# Patient Record
Sex: MALE
[De-identification: names, ages, dates, MRNs, and addresses within clinical notes are randomized per-mention and may not be internally consistent; named-entity substitution may affect disease eponyms.]

---

## 2017-11-05 ENCOUNTER — CHARTING TRANS (OUTPATIENT)
Dept: OTHER | Age: 32
End: 2017-11-05

## 2017-11-05 ENCOUNTER — LAB SERVICES (OUTPATIENT)
Dept: OTHER | Age: 32
End: 2017-11-05

## 2017-11-05 LAB — RAPID STREP GROUP A: NORMAL

## 2018-11-02 VITALS
RESPIRATION RATE: 16 BRPM | HEIGHT: 75 IN | TEMPERATURE: 98.6 F | BODY MASS INDEX: 21.76 KG/M2 | SYSTOLIC BLOOD PRESSURE: 110 MMHG | HEART RATE: 70 BPM | WEIGHT: 175 LBS | DIASTOLIC BLOOD PRESSURE: 70 MMHG

## 2023-12-13 ENCOUNTER — LAB ENCOUNTER (OUTPATIENT)
Dept: LAB | Age: 38
End: 2023-12-13
Attending: FAMILY MEDICINE
Payer: COMMERCIAL

## 2023-12-13 ENCOUNTER — OFFICE VISIT (OUTPATIENT)
Dept: FAMILY MEDICINE CLINIC | Facility: CLINIC | Age: 38
End: 2023-12-13
Payer: COMMERCIAL

## 2023-12-13 VITALS
TEMPERATURE: 99 F | HEIGHT: 75 IN | BODY MASS INDEX: 23.87 KG/M2 | DIASTOLIC BLOOD PRESSURE: 56 MMHG | SYSTOLIC BLOOD PRESSURE: 100 MMHG | RESPIRATION RATE: 18 BRPM | HEART RATE: 58 BPM | WEIGHT: 192 LBS

## 2023-12-13 DIAGNOSIS — M25.50 ARTHRALGIA, UNSPECIFIED JOINT: ICD-10-CM

## 2023-12-13 DIAGNOSIS — E78.00 HYPERCHOLESTEREMIA: ICD-10-CM

## 2023-12-13 DIAGNOSIS — Z12.5 SCREENING FOR MALIGNANT NEOPLASM OF PROSTATE: ICD-10-CM

## 2023-12-13 DIAGNOSIS — Z00.00 HEALTHCARE MAINTENANCE: ICD-10-CM

## 2023-12-13 DIAGNOSIS — Z23 NEED FOR VACCINATION: ICD-10-CM

## 2023-12-13 PROBLEM — F41.9 ANXIETY: Status: ACTIVE | Noted: 2023-12-13

## 2023-12-13 PROBLEM — G43.009 MIGRAINE WITHOUT AURA AND WITHOUT STATUS MIGRAINOSUS, NOT INTRACTABLE: Status: ACTIVE | Noted: 2017-08-02

## 2023-12-13 PROBLEM — M25.512 CHRONIC LEFT SHOULDER PAIN: Status: ACTIVE | Noted: 2017-08-02

## 2023-12-13 PROBLEM — R10.11 RUQ PAIN: Status: ACTIVE | Noted: 2023-03-14

## 2023-12-13 PROBLEM — G89.29 CHRONIC LEFT SHOULDER PAIN: Status: ACTIVE | Noted: 2017-08-02

## 2023-12-13 PROBLEM — K21.9 GASTROESOPHAGEAL REFLUX DISEASE: Status: ACTIVE | Noted: 2023-03-14

## 2023-12-13 LAB
CHOLEST SERPL-MCNC: 238 MG/DL (ref ?–200)
COMPLEXED PSA SERPL-MCNC: 0.93 NG/ML (ref ?–4)
FASTING PATIENT LIPID ANSWER: YES
HDLC SERPL-MCNC: 50 MG/DL (ref 40–59)
LDLC SERPL CALC-MCNC: 171 MG/DL (ref ?–100)
NONHDLC SERPL-MCNC: 188 MG/DL (ref ?–130)
TRIGL SERPL-MCNC: 98 MG/DL (ref 30–149)
VLDLC SERPL CALC-MCNC: 19 MG/DL (ref 0–30)

## 2023-12-13 PROCEDURE — 3074F SYST BP LT 130 MM HG: CPT | Performed by: FAMILY MEDICINE

## 2023-12-13 PROCEDURE — 3078F DIAST BP <80 MM HG: CPT | Performed by: FAMILY MEDICINE

## 2023-12-13 PROCEDURE — 3008F BODY MASS INDEX DOCD: CPT | Performed by: FAMILY MEDICINE

## 2023-12-13 PROCEDURE — 90686 IIV4 VACC NO PRSV 0.5 ML IM: CPT | Performed by: FAMILY MEDICINE

## 2023-12-13 PROCEDURE — 90471 IMMUNIZATION ADMIN: CPT | Performed by: FAMILY MEDICINE

## 2023-12-13 PROCEDURE — 80061 LIPID PANEL: CPT

## 2023-12-13 PROCEDURE — 99386 PREV VISIT NEW AGE 40-64: CPT | Performed by: FAMILY MEDICINE

## 2023-12-13 RX ORDER — MELOXICAM 7.5 MG/1
7.5 TABLET ORAL
COMMUNITY
Start: 2021-02-03 | End: 2023-12-13 | Stop reason: ALTCHOICE

## 2023-12-13 RX ORDER — LORATADINE 10 MG/1
10 TABLET ORAL AS NEEDED
COMMUNITY

## 2023-12-13 RX ORDER — DICLOFENAC SODIUM 75 MG/1
75 TABLET, DELAYED RELEASE ORAL 2 TIMES DAILY PRN
Qty: 30 TABLET | Refills: 0 | Status: SHIPPED | OUTPATIENT
Start: 2023-12-13 | End: 2024-01-12

## 2023-12-13 RX ORDER — OMEPRAZOLE 40 MG/1
40 CAPSULE, DELAYED RELEASE ORAL DAILY
COMMUNITY

## 2023-12-14 ENCOUNTER — PATIENT MESSAGE (OUTPATIENT)
Dept: FAMILY MEDICINE CLINIC | Facility: CLINIC | Age: 38
End: 2023-12-14

## 2023-12-18 ENCOUNTER — OFFICE VISIT (OUTPATIENT)
Dept: FAMILY MEDICINE CLINIC | Facility: CLINIC | Age: 38
End: 2023-12-18
Payer: COMMERCIAL

## 2023-12-18 VITALS
OXYGEN SATURATION: 97 % | HEIGHT: 75 IN | SYSTOLIC BLOOD PRESSURE: 124 MMHG | RESPIRATION RATE: 18 BRPM | WEIGHT: 192 LBS | BODY MASS INDEX: 23.87 KG/M2 | TEMPERATURE: 98 F | DIASTOLIC BLOOD PRESSURE: 68 MMHG | HEART RATE: 88 BPM

## 2023-12-18 DIAGNOSIS — E78.00 ELEVATED LDL CHOLESTEROL LEVEL: Primary | ICD-10-CM

## 2023-12-18 PROCEDURE — 3074F SYST BP LT 130 MM HG: CPT | Performed by: FAMILY MEDICINE

## 2023-12-18 PROCEDURE — 3008F BODY MASS INDEX DOCD: CPT | Performed by: FAMILY MEDICINE

## 2023-12-18 PROCEDURE — 99213 OFFICE O/P EST LOW 20 MIN: CPT | Performed by: FAMILY MEDICINE

## 2023-12-18 PROCEDURE — 3078F DIAST BP <80 MM HG: CPT | Performed by: FAMILY MEDICINE

## 2023-12-29 ENCOUNTER — OFFICE VISIT (OUTPATIENT)
Dept: FAMILY MEDICINE CLINIC | Facility: CLINIC | Age: 38
End: 2023-12-29
Payer: COMMERCIAL

## 2023-12-29 VITALS
HEART RATE: 66 BPM | TEMPERATURE: 99 F | HEIGHT: 75 IN | WEIGHT: 190.25 LBS | SYSTOLIC BLOOD PRESSURE: 138 MMHG | DIASTOLIC BLOOD PRESSURE: 82 MMHG | RESPIRATION RATE: 16 BRPM | OXYGEN SATURATION: 97 % | BODY MASS INDEX: 23.66 KG/M2

## 2023-12-29 DIAGNOSIS — H66.002 NON-RECURRENT ACUTE SUPPURATIVE OTITIS MEDIA OF LEFT EAR WITHOUT SPONTANEOUS RUPTURE OF TYMPANIC MEMBRANE: Primary | ICD-10-CM

## 2023-12-29 DIAGNOSIS — H69.92 DYSFUNCTION OF LEFT EUSTACHIAN TUBE: ICD-10-CM

## 2023-12-29 PROCEDURE — 3075F SYST BP GE 130 - 139MM HG: CPT

## 2023-12-29 PROCEDURE — 3008F BODY MASS INDEX DOCD: CPT

## 2023-12-29 PROCEDURE — 3079F DIAST BP 80-89 MM HG: CPT

## 2023-12-29 PROCEDURE — 99213 OFFICE O/P EST LOW 20 MIN: CPT

## 2023-12-29 RX ORDER — AMOXICILLIN 875 MG/1
875 TABLET, COATED ORAL 2 TIMES DAILY
Qty: 20 TABLET | Refills: 0 | Status: SHIPPED | OUTPATIENT
Start: 2023-12-29 | End: 2024-01-08

## 2023-12-29 RX ORDER — PREDNISONE 20 MG/1
40 TABLET ORAL DAILY
Qty: 10 TABLET | Refills: 0 | Status: SHIPPED | OUTPATIENT
Start: 2023-12-29 | End: 2024-01-03

## 2024-01-22 ENCOUNTER — PATIENT MESSAGE (OUTPATIENT)
Dept: FAMILY MEDICINE CLINIC | Facility: CLINIC | Age: 39
End: 2024-01-22

## 2024-01-22 NOTE — TELEPHONE ENCOUNTER
From: Reyes Lay  To: Sukhjinder Chapa  Sent: 1/22/2024 11:07 AM CST  Subject: Pain on right side    Hello,  My right rib cage and right back have continued to ache. The Diclofenac Sodium prescription hasn't made any noticeable difference and I have this cough that comes and goes. It seems worse at night. I am worried this could be something more serious than a muscular imbalance. I've had three arthroscopic surgeries on my left shoulder (2) and (1) on my right hip so my body is certainly out of balance. I'm concerned it could be a lung cancer issue. I smoked about 200 cigarettes about 20 years ago and previously smoked cannibus recreationally in very low does (I quit that bad habit). Can I get screened for lung cancer via low-dose computed tomography?

## 2024-01-22 NOTE — TELEPHONE ENCOUNTER
Called pt back and discussed his [employer] message.  Informed pt, per Dr. Chapa's note form his last office visit on 12/13/2023.    3. Arthralgia, unspecified joint  - diclofenac 75 MG Oral Tab EC; Take 1 tablet (75 mg total) by mouth 2 (two) times daily as needed.  Dispense: 30 tablet; Refill: 0  I did discuss with the patient that with the discomfort that he has been having of the right hip right knee as well as left shoulder this is likely more arthritic possibly overuse injury he was given a prescription for diclofenac to be used as needed with food I did discuss with him to continue with a heating pad as well as stretching exercises he was asked to follow-up if he has any continued discomfort.    Attempted to make an appt for an appt tomorrow, but per pt, he does not have his calendar with him at this time, but will cb tomorrow to schedule.

## 2024-02-28 ENCOUNTER — LAB ENCOUNTER (OUTPATIENT)
Dept: LAB | Age: 39
End: 2024-02-28
Attending: INTERNAL MEDICINE
Payer: COMMERCIAL

## 2024-02-28 ENCOUNTER — OFFICE VISIT (OUTPATIENT)
Dept: INTERNAL MEDICINE CLINIC | Facility: CLINIC | Age: 39
End: 2024-02-28
Payer: COMMERCIAL

## 2024-02-28 VITALS
SYSTOLIC BLOOD PRESSURE: 108 MMHG | HEIGHT: 74 IN | WEIGHT: 187.63 LBS | OXYGEN SATURATION: 99 % | BODY MASS INDEX: 24.08 KG/M2 | RESPIRATION RATE: 18 BRPM | DIASTOLIC BLOOD PRESSURE: 76 MMHG | TEMPERATURE: 98 F

## 2024-02-28 DIAGNOSIS — R05.1 ACUTE COUGH: ICD-10-CM

## 2024-02-28 DIAGNOSIS — M79.18 PAIN OF PARASPINAL MUSCLE: ICD-10-CM

## 2024-02-28 DIAGNOSIS — R04.2 BLOOD IN SPUTUM: ICD-10-CM

## 2024-02-28 DIAGNOSIS — E78.00 HYPERCHOLESTEREMIA: ICD-10-CM

## 2024-02-28 DIAGNOSIS — M79.18 INTERCOSTAL MUSCLE PAIN: ICD-10-CM

## 2024-02-28 DIAGNOSIS — J45.20 MILD INTERMITTENT ASTHMA WITHOUT COMPLICATION (HCC): ICD-10-CM

## 2024-02-28 DIAGNOSIS — E78.00 HYPERCHOLESTEREMIA: Primary | ICD-10-CM

## 2024-02-28 DIAGNOSIS — Z82.49 FAMILY HISTORY OF HEART DISEASE: ICD-10-CM

## 2024-02-28 LAB
CHOLEST SERPL-MCNC: 250 MG/DL (ref ?–200)
FASTING PATIENT LIPID ANSWER: YES
HDLC SERPL-MCNC: 50 MG/DL (ref 40–59)
LDLC SERPL CALC-MCNC: 189 MG/DL (ref ?–100)
NONHDLC SERPL-MCNC: 200 MG/DL (ref ?–130)
TRIGL SERPL-MCNC: 67 MG/DL (ref 30–149)
VLDLC SERPL CALC-MCNC: 14 MG/DL (ref 0–30)

## 2024-02-28 PROCEDURE — 3078F DIAST BP <80 MM HG: CPT | Performed by: INTERNAL MEDICINE

## 2024-02-28 PROCEDURE — 3074F SYST BP LT 130 MM HG: CPT | Performed by: INTERNAL MEDICINE

## 2024-02-28 PROCEDURE — 3008F BODY MASS INDEX DOCD: CPT | Performed by: INTERNAL MEDICINE

## 2024-02-28 PROCEDURE — 99204 OFFICE O/P NEW MOD 45 MIN: CPT | Performed by: INTERNAL MEDICINE

## 2024-02-28 PROCEDURE — 80061 LIPID PANEL: CPT | Performed by: INTERNAL MEDICINE

## 2024-02-28 RX ORDER — CODEINE PHOSPHATE AND GUAIFENESIN 10; 100 MG/5ML; MG/5ML
5 SOLUTION ORAL EVERY 6 HOURS PRN
Qty: 180 ML | Refills: 0 | Status: SHIPPED | OUTPATIENT
Start: 2024-02-28

## 2024-02-28 RX ORDER — ALBUTEROL SULFATE 90 UG/1
1 AEROSOL, METERED RESPIRATORY (INHALATION) EVERY 6 HOURS PRN
Qty: 1 EACH | Refills: 0 | Status: SHIPPED | OUTPATIENT
Start: 2024-02-28

## 2024-02-28 NOTE — PROGRESS NOTES
Reyes Lay  3/4/1985    Chief Complaint   Patient presents with    Pain     ES rm - 11 - Constant dull R rib cage pain radiating to right leg for 1 1/2 yrs at a 2/10.       SUBJECTIVE   Reyes Lay is a 38 year old male who presents for evaluation of chronic rib pain and intermittent wheezing and shortness of breath.    The rib pain is described as dull and has been constant over the last 1-2 years. Located on anterolateral lower rib cage. Had CXR and RUQ US done in the past both unremarkable.     He also has right lower back pain. Improves somewhat w/ foam roll and application of heat.    He was diagnosed w/ asthma as a child and has not had an exacerbation for decades. Sometimes feels wheezy w/ exertion. Had EKG in the past that was unremarkable.     In Dec he had an upper and lower respiratory tract infection, had a very deep cough. He noticed a speck of blood x 1 after a coughing fit.    Review of Systems   Review of Systems   No f/c/chest pain or sob. No cough. No abd pain/n/v/d. No ha or dizziness. No numbness, tingling, or weakness. No other complaints today.    OBJECTIVE:   /76 (BP Location: Left arm, Patient Position: Sitting, Cuff Size: large)   Temp 97.7 °F (36.5 °C) (Temporal)   Resp 18   Ht 6' 2\" (1.88 m)   Wt 187 lb 9.6 oz (85.1 kg)   SpO2 99%   BMI 24.09 kg/m²   Physical Exam    Cardiovascular: Normal rate, regular rhythm and intact distal pulses.  No murmur, rubs or gallops.   Pulmonary/Chest: Effort normal and breath sounds normal. No respiratory distress.  Abdominal: Soft. Bowel sounds are normal. Non tender, no masses, no organomegaly or hernias.  Musculoskeletal: No tenderness thoracic or lumbar spinous processes. Tenderness in right lower paraspinal musculature including ILS  and QL. Tenderness of right intercostal muscles between ribs 6-10.    ASSESSMENT AND PLAN:       ICD-10-CM    1. Hypercholesteremia  E78.00 Lipid Panel [E]      2. Mild intermittent asthma  without complication ([facility])  J45.20 albuterol 108 (90 Base) MCG/ACT Inhalation Aero Soln   Per patient completed spirometry in the last few years. Trial of AMBROSE for wheezing.    3. Acute cough  R05.1 guaiFENesin-codeine (CHERATUSSIN AC) 100-10 MG/5ML Oral Solution      4. Pain of paraspinal muscle  M79.18 Ropy erector spinae muscles liz on the right. Not interested in PT at this time. Consider physiatry referral.      5. Intercostal muscle pain  M79.18 Had RUQ US and radiograph done in the past for this pain. Cont conservative measures for now. Recommend heart scan as over read will show (most of) the ribs and lungs.      6. Family history of heart disease  Z82.49 Repeating lipid panel and also heart scan as above.      7. Blood in sputum  R04.2 Small speck x 1 after a coughing fit. Monitor for now. Let me know ASAP if recurs.                The patient indicates understanding of these issues and agrees to the plan.  The patient is asked to return or present to the emergency room for worsening of symptoms.    TODAY'S ORDERS     Orders Placed This Encounter   Procedures    Lipid Panel [E]       Meds & Refills:  Requested Prescriptions     Signed Prescriptions Disp Refills    albuterol 108 (90 Base) MCG/ACT Inhalation Aero Soln 1 each 0     Sig: Inhale 1 puff into the lungs every 6 (six) hours as needed.    guaiFENesin-codeine (CHERATUSSIN AC) 100-10 MG/5ML Oral Solution 180 mL 0     Sig: Take 5 mL by mouth every 6 (six) hours as needed.       Imaging & Consults:  None    No follow-ups on file.  There are no Patient Instructions on file for this visit.    All questions were answered and the patient agrees with the plan.     Thank you,  Tobias Schrader, DO

## 2024-03-01 ENCOUNTER — HOSPITAL ENCOUNTER (OUTPATIENT)
Dept: CT IMAGING | Age: 39
Discharge: HOME | End: 2024-03-01
Attending: INTERNAL MEDICINE

## 2024-03-01 DIAGNOSIS — Z13.6 SCREENING FOR ISCHEMIC HEART DISEASE: ICD-10-CM

## 2024-03-11 ENCOUNTER — TELEMEDICINE (OUTPATIENT)
Dept: INTERNAL MEDICINE CLINIC | Facility: CLINIC | Age: 39
End: 2024-03-11
Payer: COMMERCIAL

## 2024-03-11 ENCOUNTER — PATIENT MESSAGE (OUTPATIENT)
Dept: INTERNAL MEDICINE CLINIC | Facility: CLINIC | Age: 39
End: 2024-03-11

## 2024-03-11 ENCOUNTER — TELEPHONE (OUTPATIENT)
Dept: INTERNAL MEDICINE CLINIC | Facility: CLINIC | Age: 39
End: 2024-03-11

## 2024-03-11 DIAGNOSIS — E78.00 HYPERCHOLESTEREMIA: ICD-10-CM

## 2024-03-11 DIAGNOSIS — R00.2 PALPITATIONS: ICD-10-CM

## 2024-03-11 DIAGNOSIS — J45.20 MILD INTERMITTENT ASTHMA WITHOUT COMPLICATION (HCC): ICD-10-CM

## 2024-03-11 DIAGNOSIS — R93.89 ABNORMAL CT OF THE CHEST: Primary | ICD-10-CM

## 2024-03-11 RX ORDER — ALBUTEROL SULFATE 90 UG/1
1 AEROSOL, METERED RESPIRATORY (INHALATION) EVERY 6 HOURS PRN
Qty: 1 EACH | Refills: 11 | Status: SHIPPED | OUTPATIENT
Start: 2024-03-11

## 2024-03-11 NOTE — PROGRESS NOTES
Reyes Lay is a 39 year old male.   HPI:    The patient is seen virtually to discuss result of heart scan and labs.      Total Ca Score 0  Over-read showed 'benign-appearing plaque-like thickening along the major left interlobar fissure'.    He is intentionally unemployed. His job was very stressful and now he is able to spend for more w/ his daughter.    Sometimes feels heart thumbing which is typically assoc w/ anxiety.    Still has pain on the right side of his chest, likely MSK.    AMBROSE has helped cough  and asthma significantly. Has more energy recently.      Allergies:  Allergies   Allergen Reactions    Fluzone High-Dose DIARRHEA, NAUSEA AND VOMITING, Coughing and FEVER    Other OTHER (SEE COMMENTS)     Watery eyes and sneezing    Shellfish Allergy DIARRHEA and NAUSEA AND VOMITING     No airway: No EPI Pens.    Sulfa Antibiotics RASH and UNKNOWN    Dust OTHER (SEE COMMENTS)      Current Meds:  Current Outpatient Medications   Medication Sig Dispense Refill    albuterol 108 (90 Base) MCG/ACT Inhalation Aero Soln Inhale 1 puff into the lungs every 6 (six) hours as needed. 1 each 11    albuterol 108 (90 Base) MCG/ACT Inhalation Aero Soln Inhale 1 puff into the lungs every 6 (six) hours as needed. 1 each 0    guaiFENesin-codeine (CHERATUSSIN AC) 100-10 MG/5ML Oral Solution Take 5 mL by mouth every 6 (six) hours as needed. 180 mL 0    Omeprazole 40 MG Oral Capsule Delayed Release Take 1 capsule (40 mg total) by mouth daily.      loratadine 10 MG Oral Tab Take 1 tablet (10 mg total) by mouth as needed.          PMH:   No past medical history on file.      ROS:   GENERAL: Negative for fever, chills and fatigue. NAD.  HENT: Negative for congestion, sore throat, and ear pain.  RESPIRATORY: Negative for cough, chest tightness, shortness of breath and wheezing.    CV: Negative for chest pain. Positive for palpitations.  GI: Negative for nausea, vomiting, abdominal pain, diarrhea, and blood in stool.    : Negative for dysuria, hematuria and difficulty urinating.   MUSCULOSKELETAL: Negative for myalgias, back pain, joint swelling, arthralgias and gait problem.   NEURO: Negative for dizziness, syncope, weakness, numbness, tingling and headaches.   PSYCH: The patient is not nervous/anxious. No depression.      PHYSICAL EXAM:   No vital signs or physical exam completed for this visit as visit was done via telehealth.       ASSESSMENT/ PLAN:       ICD-10-CM    1. Abnormal CT of the chest  R93.89 Pulmonary Referral - In Network   Described as benign but will refer to Pulm to review CT.   2. Mild intermittent asthma without complication (HCC)  J45.20 Pulmonary Referral - In Network   Cont PRN AMBROSE   3. Hypercholesteremia  E78.00 Lipid Panel [E]   Trial of lifestyle modifications including Mediterranean diet and exercise for the next 3 mo and repeat. He has a strong family history of hypercholesterolemia.   4. Palpitations  R00.2    May be related to anxiety. Will have patient come to office for EKG in the next 1-2 weeks.         Health Maintenance Due   Topic Date Due    Asthma Action Plan  Never done    Asthma Control Test  Never done    Pneumococcal Vaccine: Birth to 64yrs (1 of 2 - PCV) Never done    COVID-19 Vaccine (4 - 2023-24 season) 09/01/2023    Annual Depression Screening  01/01/2024         Pt indicates understanding and agrees to the plan.     No follow-ups on file.    Tobias Schrader, DO        Reyes Lay understands phone evaluation is not a substitute for face-to-face examination or emergency care. Patient advised to go to ER or call 911 for worsening symptoms or acute distress.     Please note that the following visit was completed using two-way, real-time interactive video communication.  This has been done in good blayne to provide continuity of care in the best interest of the provider-patient relationship, due to the on-going public health crisis/national emergency and because of  restrictions of visitation.  There are limitations of this visit as no physical exam could be performed.  Every conscious effort was taken to allow for sufficient and adequate time.  This billing visit was spent on reviewing labs, medications, radiology tests and decision making.  Appropriate medical decision-making and tests are ordered as detailed in the plan of care above.

## 2024-03-12 NOTE — TELEPHONE ENCOUNTER
Per MP 3/11 VV note:    4. Palpitations  R00.2     May be related to anxiety. Will have patient come to office for EKG in the next 1-2 weeks.     KARLIE ELIZABETH on update.

## 2024-03-12 NOTE — TELEPHONE ENCOUNTER
From: Reyes Lay  To: Tobias Schrader  Sent: 3/11/2024 4:04 PM CDT  Subject: EKG Referral    Hello Dr. Schrader,  I got the Pulmonary specialist appointment set up for next Wed 3/20 w/ Dr. Prabhakar. However, I didn't see the second referral for the EKG we discussed. Could you please provide that?     As for the inhaler refills, [employer] / my insurance wouldn't fill that yet. I still have 176 puffs remaining which is fine for awhile. I might need a refill request later.     Thanks again for your help!  -Micky

## 2024-03-12 NOTE — TELEPHONE ENCOUNTER
Pt scheduled for EKG with [facility].    Future Appointments   Date Time Provider Department Center   3/18/2024  1:00 PM Tobias Schrader,  [facility]   3/20/2024  9:00 AM Deniz Prabhakar MD  [facility]

## 2024-03-18 ENCOUNTER — OFFICE VISIT (OUTPATIENT)
Dept: INTERNAL MEDICINE CLINIC | Facility: CLINIC | Age: 39
End: 2024-03-18
Payer: COMMERCIAL

## 2024-03-18 DIAGNOSIS — M79.18 INTERCOSTAL MUSCLE PAIN: ICD-10-CM

## 2024-03-18 DIAGNOSIS — R07.81 RIB PAIN: ICD-10-CM

## 2024-03-18 DIAGNOSIS — M54.41 CHRONIC RIGHT-SIDED LOW BACK PAIN WITH RIGHT-SIDED SCIATICA: ICD-10-CM

## 2024-03-18 DIAGNOSIS — E78.00 HYPERCHOLESTEREMIA: ICD-10-CM

## 2024-03-18 DIAGNOSIS — R00.2 PALPITATIONS: Primary | ICD-10-CM

## 2024-03-18 DIAGNOSIS — G89.29 CHRONIC RIGHT-SIDED LOW BACK PAIN WITH RIGHT-SIDED SCIATICA: ICD-10-CM

## 2024-03-18 LAB
ATRIAL RATE: 61 BPM
P AXIS: 48 DEGREES
P-R INTERVAL: 164 MS
Q-T INTERVAL: 392 MS
QRS DURATION: 102 MS
QTC CALCULATION (BEZET): 394 MS
R AXIS: 58 DEGREES
T AXIS: 42 DEGREES
VENTRICULAR RATE: 61 BPM

## 2024-03-18 NOTE — PROGRESS NOTES
Reyes Lay  3/4/1985    No chief complaint on file.    SUBJECTIVE   Reyes Lay is a 39 year old male who presents for follow up.    Recently CAC score 0. He is making many wonderful lifestyle changes including consuming a more well balanced diet, decreasing EtOH intake and exercising.    Was experiencing palpitations but not so much lately. May have been 2/2 anxiety.    Sternum cracks when he moves chest.    Review of Systems   Review of Systems   No f/c/chest pain or sob. No cough. No abd pain/n/v/d. No ha or dizziness. No numbness, tingling, or weakness. No other complaints today.    OBJECTIVE:   There were no vitals taken for this visit.  Physical Exam   Constitutional: Oriented to person, place, and time. No distress.   Cardiovascular: Normal rate, regular rhythm and intact distal pulses.  No murmur, rubs or gallops.   Pulmonary/Chest: Effort normal and breath sounds normal. No respiratory distress.  Musculoskeletal: No edema    ASSESSMENT AND PLAN:       ICD-10-CM    1. Palpitations  R00.2 EKG with interpretation and Report -IN OFFICE [[id number]]   In office EKG w/ NSR and evidence of ischemia, poss incomplete RBBB. No longer experiencing palpitations so can monitor for now. If recurs then will order Holter.   2. Hypercholesteremia  E78.00    Trial of lifestyle modifications including changes in diet and exercise. May be familiar component. Repeat in a few months after trial of lifestyle modifications since CAC score 0.   3. Rib pain  R07.81 Physiatry Referral - In Network   For 3-5 would benefit from PM&R consult for eval of chronic pain. May benefit from PT.   4. Chronic right-sided low back pain with right-sided sciatica  M54.41 Physiatry Referral - In Network    G89.29       5. Intercostal muscle pain  M79.18 Physiatry Referral - In Network            The patient indicates understanding of these issues and agrees to the plan.  The patient is asked to return or present to the emergency room  for worsening of symptoms.    TODAY'S ORDERS     No orders of the defined types were placed in this encounter.      Meds & Refills:  Requested Prescriptions      No prescriptions requested or ordered in this encounter       Imaging & Consults:  ELECTROCARDIOGRAM, COMPLETE  PHYSIATRY - INTERNAL    No follow-ups on file.  There are no Patient Instructions on file for this visit.    All questions were answered and the patient agrees with the plan.     Thank you,  Tobias Schrader, DO

## 2024-03-20 ENCOUNTER — OFFICE VISIT (OUTPATIENT)
Facility: CLINIC | Age: 39
End: 2024-03-20
Payer: COMMERCIAL

## 2024-03-20 VITALS
HEART RATE: 72 BPM | OXYGEN SATURATION: 97 % | RESPIRATION RATE: 16 BRPM | SYSTOLIC BLOOD PRESSURE: 120 MMHG | BODY MASS INDEX: 23.75 KG/M2 | WEIGHT: 191 LBS | HEIGHT: 75 IN | DIASTOLIC BLOOD PRESSURE: 80 MMHG

## 2024-03-20 DIAGNOSIS — J45.40 MODERATE PERSISTENT ASTHMA WITHOUT COMPLICATION (HCC): ICD-10-CM

## 2024-03-20 DIAGNOSIS — R93.89 ABNORMAL CT OF THE CHEST: ICD-10-CM

## 2024-03-20 DIAGNOSIS — J98.4 LUNG ABNORMALITY: Primary | ICD-10-CM

## 2024-03-20 PROCEDURE — 3079F DIAST BP 80-89 MM HG: CPT | Performed by: INTERNAL MEDICINE

## 2024-03-20 PROCEDURE — 3008F BODY MASS INDEX DOCD: CPT | Performed by: INTERNAL MEDICINE

## 2024-03-20 PROCEDURE — 3074F SYST BP LT 130 MM HG: CPT | Performed by: INTERNAL MEDICINE

## 2024-03-20 PROCEDURE — 99204 OFFICE O/P NEW MOD 45 MIN: CPT | Performed by: INTERNAL MEDICINE

## 2024-03-20 RX ORDER — FLUTICASONE FUROATE AND VILANTEROL 100; 25 UG/1; UG/1
1 POWDER RESPIRATORY (INHALATION) DAILY
Qty: 1 EACH | Refills: 2 | Status: SHIPPED | OUTPATIENT
Start: 2024-03-20

## 2024-03-20 NOTE — H&P
[facility] Pulmonary Consult Note    History of Present Illness:  Reyes Lay is a 39 year old male never smoker but +cannabis smoker with significant PMH of asthma who presents today for evaluation of abnormal chest imaging and dyspnea/wheeze. He reports having a history of asthma when younger and used albuterol PRN, he had been controlled however over the past several months he has had worsening cough, wheeze and dyspnea. Symptoms developed post flu shot had symptoms within several hours and reports having respiratory infections from his child at .   Albuterol helps but still not at baseline. Able to exercise and function.   He had a cardiac scan on workup for palpitations which was normal cardiac score however did have fissural plaque on the left.     Did smoke cannabis for the past many years but quit recently.  Never tobacco smoker.   Works as .  Not aware of any significant exposures to chemicals, fumes    Past Medical History:   History reviewed. No pertinent past medical history.     Past Surgical History: History reviewed. No pertinent surgical history.    Family Medical History: History reviewed. No pertinent family history.     Social History:   Social History     Socioeconomic History    Marital status:      Spouse name: Not on file    Number of children: Not on file    Years of education: Not on file    Highest education level: Not on file   Occupational History    Not on file   Tobacco Use    Smoking status: Never    Smokeless tobacco: Never   Substance and Sexual Activity    Alcohol use: Yes     Alcohol/week: 2.0 standard drinks of alcohol     Types: 2 Cans of beer per week    Drug use: Not Currently     Types: Cannabis     Comment: smoked for 10 years 2-3 x a week. quit 2 months ago    Sexual activity: Not on file   Other Topics Concern    Not on file   Social History Narrative    Not on file     Social Determinants of Health     Financial Resource Strain: Not on  file   Food Insecurity: Not on file   Transportation Needs: Not on file   Physical Activity: Not on file   Stress: Not on file   Social Connections: Not on file   Housing Stability: Not on file        Allergies: Fluzone high-dose, Other, Shellfish allergy, Sulfa antibiotics, and Dust     Medications:   Current Outpatient Medications   Medication Sig Dispense Refill    Multiple Vitamin (MULTIVITAMIN ADULT OR) Take 1 tablet by mouth daily.      Omega-3 Fatty Acids (FISH OIL OR) Take 1 tablet by mouth daily.      fluticasone furoate-vilanterol (BREO ELLIPTA) 100-25 MCG/ACT Inhalation Aerosol Powder, Breath Activated Inhale 1 puff into the lungs daily. 1 each 2    albuterol 108 (90 Base) MCG/ACT Inhalation Aero Soln Inhale 1 puff into the lungs every 6 (six) hours as needed. 1 each 11    guaiFENesin-codeine (CHERATUSSIN AC) 100-10 MG/5ML Oral Solution Take 5 mL by mouth every 6 (six) hours as needed. 180 mL 0    Omeprazole 40 MG Oral Capsule Delayed Release Take 1 capsule (40 mg total) by mouth daily.      loratadine 10 MG Oral Tab Take 1 tablet (10 mg total) by mouth as needed.         Review of Systems: Review of Systems   Constitutional: Negative.    HENT: Negative.     Respiratory:  Positive for cough, shortness of breath and wheezing.    Cardiovascular: Negative.    All other systems reviewed and are negative.      Physical Exam:  /80 (BP Location: Left arm, Patient Position: Sitting, Cuff Size: adult)   Pulse 72   Resp 16   Ht 6' 3\" (1.905 m)   Wt 191 lb (86.6 kg)   SpO2 97%   BMI 23.87 kg/m²      Constitutional: alert, cooperative. No acute distress.  HEENT: Head NC/AT. Mask in place  Cardio: Regular rate and rhythm. Normal S1 and S2. No murmurs.   Respiratory: Thorax symmetrical with no labored breathing. Clear to ausculation bilaterally with symmetrical breath sounds. Rare wheeze with effortful breath. No distress  GI: NABS. Abd soft, non-tender.  Extremities: No clubbing or cyanosis. No BLE edema.     Neurologic: A&Ox3. No gross motor deficits.  Skin: Warm, dry  Psych: Calm, cooperative. Pleasant affect.    Results:  Personally reviewed  No CBC panel on file.     No CMP panel on file.     CT CALCIUM SCORING    Result Date: 3/5/2024  PROCEDURE:  CT CALCIUM SCORING  COMPARISON:  None.  INDICATIONS:  Z13.6 Screening for ischemic heart disease  TECHNIQUE:  The patient was placed in the supine position on the multidetector CT table and high-resolution non-contrast limited CT images of the heart, coronary arteries and proximal great vessels were obtained. Dose reduction techniques were used. Dose  information is transmitted to the ACR (American College of Radiology) NRDR (National Radiology Data Registry) which includes the Dose Index Registry. This cardiac scan was interpreted by a cardiologist with respect to the heart only. Please consult the radiology report for further interpretation  FINDINGS:   REGION  CALCIUM SCORE  LEFT MAIN:  0 LEFT ANTERIOR DESCENDIN CIRCUMFLEX:  0 RIGHT CORONARY ARTERY:    0  TOTAL CALCIUM SCORE:  0   Calcium Score  Implication   0  No identifiable calcified plaque   1-100  Mild atherosclerotic plaque   101-300  Moderate atherosclerotic plaque   301-999  Moderate-severe atherosclerotic plaque   >1000  Severe atherosclerotic plaque  1. J Am Myra Cardiol Img. 2022 Nov, [phone]  Clinical Relevance of Coronary Artery Scan  This patient identified you as their physician, if this is not correct please contact the Nurse [facility] at [phone] and they will assign this report to another physician.    Dictated by (CST): Jesus Langford MD on 3/05/2024 at 12:30 PM     Finalized by (CST): Jesus Langford MD on 3/05/2024 at 12:31 PM       CT CALCIUM SCORING OVER READ    Result Date: 3/1/2024  CONCLUSION:  No acute appearing disease process identified on the cardiac over-read component of this examination.    LOCATION:  [place]   Dictated by (CST): Juan Luis Tucker MD on  3/01/2024 at 11:50 AM     Finalized by (CST): Juan Luis Tucker MD on 3/01/2024 at 11:54 AM         Assessment/Plan:  #1. 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 This finding is very likely benign either scarring or lymph node, doubt any concern for malignancy. We reviewed options for management including obtaining CT chest (optional) versus no follow up. He wishes to hold on further testing which is reasonable.     #2. asthma  Worsening control/symptoms over the past 3-4months, suspect related to infectious precipitants  Start ICS/LABA  Continue albuterol      Deniz Prabhakar MD  3/20/2024

## 2024-03-20 NOTE — PATIENT INSTRUCTIONS
Start breo inhaler - one puff once a day. Rinse your mouth out after using inhaler  Be sure to price inhalers with goal for medications to be less than $50 per month  Continue to use albuterol 2 puffs every 6 hours as needed for your breathing or cough

## 2024-03-28 ENCOUNTER — OFFICE VISIT (OUTPATIENT)
Dept: PHYSICAL MEDICINE AND REHAB | Facility: CLINIC | Age: 39
End: 2024-03-28
Payer: COMMERCIAL

## 2024-03-28 VITALS
HEIGHT: 75 IN | WEIGHT: 191 LBS | RESPIRATION RATE: 18 BRPM | HEART RATE: 86 BPM | OXYGEN SATURATION: 100 % | BODY MASS INDEX: 23.75 KG/M2

## 2024-03-28 DIAGNOSIS — G89.4 CHRONIC PAIN SYNDROME: ICD-10-CM

## 2024-03-28 DIAGNOSIS — M54.16 RIGHT LUMBAR RADICULOPATHY: Primary | ICD-10-CM

## 2024-03-28 DIAGNOSIS — F41.9 ANXIETY: ICD-10-CM

## 2024-03-28 PROCEDURE — 3008F BODY MASS INDEX DOCD: CPT | Performed by: PHYSICAL MEDICINE & REHABILITATION

## 2024-03-28 PROCEDURE — 99204 OFFICE O/P NEW MOD 45 MIN: CPT | Performed by: PHYSICAL MEDICINE & REHABILITATION

## 2024-03-28 RX ORDER — DULOXETIN HYDROCHLORIDE 30 MG/1
30 CAPSULE, DELAYED RELEASE ORAL DAILY
Qty: 30 CAPSULE | Refills: 0 | Status: SHIPPED | OUTPATIENT
Start: 2024-03-28

## 2024-03-28 NOTE — PATIENT INSTRUCTIONS
-Xray of the lumbar spine  -MRI and follow up after  -Cymbalta 30mg daily  -Consider integrative medicine/pain management

## 2024-03-29 NOTE — PROGRESS NOTES
[facility]  NEW PATIENT EVALUATION    Consultation as a request of Tobias Ritchie      HISTORY OF PRESENT ILLNESS:     Chief Complaint   Patient presents with    Hip Pain     New right handed Pt is coming in for right hip pain, Pt states that he has had surgery on right hip  and also states that he is having right knee Pain, Admits to tingling in right foot, is having right sided thoracic pain, and states that his sternum cracks a loot, Also states that he has left shoulder stabbing pain. No imaging, no medication Pain 6/10       The patient is a 39 year old male with significant past medical history of hyperlipidemia, seizure disorder, traumatic brain injury, anxiety, chronic pain syndrome who presents with multiple musculoskeletal complaints.  Patient endorses a chronic history of pain involving his right hip, left shoulder, right-sided ribs, right knee, right ankle.  For the right hip pain he has been evaluated at [facility] as well as for his left shoulder pain.  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  His symptoms have been the same as they were back in 2021 with pain along the anterior aspect of the hip.  He also has pain in the lumbar spine but does not endorse specific radiating symptoms down the right leg.  The knee pain is located anteriorly.  All of the symptoms are present at baseline even at rest but with activity sometimes the pain can exacerbate.  She rates the pain to be 6 out of 10.  He has right-sided rib pain as well and he feels like his sternum cracks a lot.  He has had treatment at [facility].  He was recommended a chronic pain management program however patient did not feel like talking to a psychologist would be helpful.  Patient states he had extensive physical therapy for these problems in the past in 2021 but has not had recent  treatment.  He has not any dedicated imaging of the lumbar spine but has had imaging of other body areas.  He has tried different medications such as anti-inflammatories and neuropathic pain medications on the improvement.  He has had acupuncture treatment with no significant change.    PHYSICAL EXAM:   Pulse 86   Resp 18   Ht 75\"   Wt 191 lb (86.6 kg)   SpO2 100%   BMI 23.87 kg/m²     Gait Normal Able to toe walk and heel walk without any difficulty    LUMBAR SPINE:  Inspection: no erythema, swelling, or obvious deformity.  Their iliac crest and shoulder heights are symmetrical.     Palpation: Non tender to palpation of the spinous process.  ROM: FAROM  Strength: 5/5 in bilateral lower extremities  Sensation: Intact to light touch in all dermatomes of the lower extremities  Reflexes: 2/4 at L4 and S1 except 1 out of 4 right L4  Facet Loading: no specific facet pain  Straight leg raise: negative for radicular pain symptoms  Slump test: negative for pain symptoms for radicular pain symptoms  CORKY and FADIR: Mildly positive  Full active range of motion of the hip.      IMAGING:     None    All imaging results were reviewed and discussed with patient.      ASSESSMENT/PLAN:     1. Right lumbar radiculopathy    2. Chronic pain syndrome    3. Anxiety        Reyes Lay is a 39-year-old male with a complex history of chronic pain syndrome who presents today for another evaluation for management of his symptoms.  Patient has had extensive treatment as noted above without significant improvement in his symptoms.  He does have a slightly decreased patellar reflex right-sided and pain in the right lower extremity overall which may be explained by a lumbar radiculopathy.  As he has not had dedicated imaging of this area I do recommend x-ray imaging of the lumbar spine as well as MRI imaging of the lumbar spine for further evaluation of a lumbar disc herniation in an attempt to help with this chronic pain.  I do  feel that he would benefit from Cymbalta which was started for him today.  Patient could benefit from a comprehensive pain management program in the future.  We also discussed integrative medicine consultation if pain is not improving as well as consideration of osteopathic manual medicine.  He would benefit from aerobic exercise program for management of myofascial pain syndrome.  Recommend follow-up after MRI imaging is completed so we can reevaluate his symptoms and consider any further treatments      The patient verbalized understanding with the plan and was in agreement. All questions/concerns were addressed and there were no barriers to learning.  Please note Dragon dictation software was used to dictate this note and may result in inadvertent typos.    Sary Liz DO, FAAPMR & CAQSM  Physical Medicine and Rehabilitation  Sports and Spine Medicine    PAST MEDICAL HISTORY:     Past Medical History:   Diagnosis Date    Hyperlipidemia 2022    LDL is high as of late 2023 / early 2024    Seizure disorder (HCC) 2000    I've had a few seizures from dyhydration and exhaustion, fortunately non in past ~15 years.    Traumatic brain injury (HCC) 1994    Had a few concussions as a child, none in ~20 years.         PAST SURGICAL HISTORY:   History reviewed. No pertinent surgical history.      CURRENT MEDICATIONS:     Current Outpatient Medications   Medication Sig Dispense Refill    DULoxetine (CYMBALTA) 30 MG Oral Cap DR Particles Take 1 capsule (30 mg total) by mouth daily. 30 capsule 0    Multiple Vitamin (MULTIVITAMIN ADULT OR) Take 1 tablet by mouth daily.      Omega-3 Fatty Acids (FISH OIL OR) Take 1 tablet by mouth daily.      fluticasone furoate-vilanterol (BREO ELLIPTA) 100-25 MCG/ACT Inhalation Aerosol Powder, Breath Activated Inhale 1 puff into the lungs daily. 1 each 2    guaiFENesin-codeine (CHERATUSSIN AC) 100-10 MG/5ML Oral Solution Take 5 mL by mouth every 6 (six) hours as needed. 180 mL 0    Omeprazole 40  MG Oral Capsule Delayed Release Take 1 capsule (40 mg total) by mouth daily.      loratadine 10 MG Oral Tab Take 1 tablet (10 mg total) by mouth as needed.           ALLERGIES:     Allergies   Allergen Reactions    Fluzone High-Dose DIARRHEA, NAUSEA AND VOMITING, Coughing and FEVER    Other OTHER (SEE COMMENTS)     Watery eyes and sneezing    Shellfish Allergy DIARRHEA and NAUSEA AND VOMITING     No airway: No EPI Pens.    Sulfa Antibiotics RASH and UNKNOWN    Dust OTHER (SEE COMMENTS)         FAMILY HISTORY:     Family History   Problem Relation Age of Onset    Heart Disease Father         Has A Fib    Stroke Maternal Grandfather          young, smoked & drank    Dementia Paternal Grandfather         Lived into mid 80s, had trouble remembering for last 10 - 15 years    Diabetes Paternal Grandfather         Type II as senior          SOCIAL HISTORY:     Social History     Socioeconomic History    Marital status:    Tobacco Use    Smoking status: Never    Smokeless tobacco: Never    Tobacco comments:     3 cigars in past 15 years   Substance and Sexual Activity    Alcohol use: Not Currently     Alcohol/week: 2.0 standard drinks of alcohol    Drug use: Not Currently     Types: Cannabis     Comment: smoked for 10 years 2-3 x a week. quit 2 months ago          REVIEW OF SYSTEMS:   No patient-reported data collected this visit.      PHYSICAL EXAM:   General: No immediate distress  Head: Normocephalic/ Atraumatic  Eyes: Extra-occular movements intact.   Ears: No auricular hematoma or deformities  Mouth: No lesions or ulcerations  Heart: peripheral pulses intact. Normal capillary refill.   Lungs: Non-labored respirations  Abdomen: No abdominal guarding  Extremities: No lower extremity edema bilaterally   Skin: No lesions noted   Cognition: alert & oriented x 3, attentive, able to follow 2 step commands, comprehention intact, spontaneous speech intact  Psychiatric: Mood and affect appropriate      LABS:   No  results found for: \"EAG\", \"A1C\"  No results found for: \"WBC\", \"RBC\", \"HGB\", \"HCT\", \"MCV\", \"MCH\", \"MCHC\", \"RDW\", \"PLT\", \"MPV\"  No results found for: \"GLU\", \"BUN\", \"BUNCREA\", \"CREATSERUM\", \"ANIONGAP\", \"GFR\", \"GFRNAA\", \"GFRAA\", \"CA\", \"OSMOCALC\", \"ALKPHO\", \"AST\", \"ALT\", \"ALKPHOS\", \"BILT\", \"TP\", \"ALB\", \"GLOBULIN\", \"AGRATIO\", \"NA\", \"K\", \"CL\", \"CO2\"  No results found for: \"PTP\", \"PT\", \"INR\"  No results found for: \"VITD\", \"QVITD\", \"[id number]\"

## 2024-04-10 ENCOUNTER — TELEPHONE (OUTPATIENT)
Dept: PHYSICAL MEDICINE AND REHAB | Facility: CLINIC | Age: 39
End: 2024-04-10

## 2024-04-10 ENCOUNTER — MED REC SCAN ONLY (OUTPATIENT)
Dept: PHYSICAL MEDICINE AND REHAB | Facility: CLINIC | Age: 39
End: 2024-04-10

## 2024-04-10 NOTE — TELEPHONE ENCOUNTER
Report placed in Dr. Liz's mailbox for his review/recommendations if appropriate. Report can be placed into scanning once review is completed.

## 2024-04-11 ENCOUNTER — TELEPHONE (OUTPATIENT)
Dept: PHYSICAL MEDICINE AND REHAB | Facility: CLINIC | Age: 39
End: 2024-04-11

## 2024-04-11 ENCOUNTER — MED REC SCAN ONLY (OUTPATIENT)
Dept: PHYSICAL MEDICINE AND REHAB | Facility: CLINIC | Age: 39
End: 2024-04-11

## 2024-04-11 NOTE — TELEPHONE ENCOUNTER
Copies made, copy placed in Dr. Liz's office in [facility] for his review.    Med Rec Scan created and original fax placed for scanning.      [facility] sent to patient to schedule a follow up w/ Dr. Liz to review MRI results.

## 2024-04-15 RX ORDER — FLUTICASONE FUROATE AND VILANTEROL TRIFENATATE 100; 25 UG/1; UG/1
1 POWDER RESPIRATORY (INHALATION) DAILY
Qty: 1 EACH | Refills: 2 | Status: SHIPPED | OUTPATIENT
Start: 2024-04-15

## 2024-04-15 NOTE — TELEPHONE ENCOUNTER
Pt last seen by Dr. Prabhakar on 3-20-24.  Start breo inhaler - one puff once a day.   Pt advised to return in 1 month.  No appt scheduled.  [facility] sent to pt to advise him to call to schedule.

## 2024-04-18 ENCOUNTER — OFFICE VISIT (OUTPATIENT)
Dept: PHYSICAL MEDICINE AND REHAB | Facility: CLINIC | Age: 39
End: 2024-04-18
Payer: COMMERCIAL

## 2024-04-18 VITALS
HEART RATE: 76 BPM | RESPIRATION RATE: 18 BRPM | HEIGHT: 75 IN | OXYGEN SATURATION: 99 % | WEIGHT: 191 LBS | BODY MASS INDEX: 23.75 KG/M2

## 2024-04-18 DIAGNOSIS — F41.9 ANXIETY: ICD-10-CM

## 2024-04-18 DIAGNOSIS — G89.4 CHRONIC PAIN SYNDROME: Primary | ICD-10-CM

## 2024-04-18 PROCEDURE — 99214 OFFICE O/P EST MOD 30 MIN: CPT | Performed by: PHYSICAL MEDICINE & REHABILITATION

## 2024-04-18 PROCEDURE — 3008F BODY MASS INDEX DOCD: CPT | Performed by: PHYSICAL MEDICINE & REHABILITATION

## 2024-04-18 NOTE — PROGRESS NOTES
[facility]  OFFICE FOLLOW UP EVALUATION      HISTORY OF PRESENT ILLNESS:     Chief Complaint   Patient presents with    Follow - Up     Pt is coming in to F/U on MRI results of lumbar spine, Pt states that he has also stopped taking medication due to GI issues, Pt states that low back is still in pain, Admits to N/T in right foot. Pain 6/10       Patient is following up for same chronic pain complaints from last visit.  He still has pain radiating in the right side of his body.  He had MRI imaging completed which she has brought with him to review today.  There is no change from his previous imaging as documented on there.  He states the Cymbalta caused side effects for him including GI symptoms with diarrhea and stomach pain.  He has discontinued it.  He did not notice any improvement after taking it for 3 to 4 days.  He rates the pain to be still persistent 6 out of 10.    PHYSICAL EXAM:   Pulse 76   Resp 18   Ht 75\"   Wt 191 lb (86.6 kg)   SpO2 99%   BMI 23.87 kg/m²     Gait Normal Able to toe walk and heel walk without any difficulty     LUMBAR SPINE:  Inspection: no erythema, swelling, or obvious deformity.  Their iliac crest and shoulder heights are symmetrical.     Palpation: Non tender to palpation of the spinous process.  ROM: FAROM  Strength: 5/5 in bilateral lower extremities  Sensation: Intact to light touch in all dermatomes of the lower extremities  Reflexes: 2/4 at L4 and S1 except 1 out of 4 right L4  Facet Loading: no specific facet pain  Straight leg raise: negative for radicular pain symptoms  Slump test: negative for pain symptoms for radicular pain symptoms  CORKY and FADIR: Mildly positive  Full active range of motion of the hip.    IMAGING:     X-ray and MRI imaging of the lumbar spine completed at outside institution at [facility] imaging on him for 924 was personally reviewed which is notable for a mild this dehydration at L4-5 and L5-S1  with a mild disc bulge and posterior annular tear at L4-5 without any significant spinal narrowing.  At L5-S1 there is a central bulging protruding disc with left posteromedial annular tear.  There is no significant spinal or foraminal narrowing.    All imaging results were reviewed and discussed with patient.      ASSESSMENT/PLAN:     1. Chronic pain syndrome    2. Anxiety        Reyes Lay is a 39 year old male following up for chronic pain symptoms with right leg pain.  I have recommended seeing integrative medicine for further evaluation to see if he would benefit from possible acupuncture treatment.  Recommend that he consider a possible diagnostic and therapeutic epidural injection which was offered for him today however he has deferred.  Recommend he follow-up with me as needed in the future.      The patient verbalized understanding with the plan and was in agreement. All questions/concerns were addressed and there were no barriers to learning.  Please note Dragon dictation software was used to dictate this note and may result in inadvertent typos.    Sary Liz DO, FAAPMR & CAQSM  Physical Medicine and Rehabilitation  Sports and Spine Medicine    PAST MEDICAL HISTORY:     Past Medical History:    Hyperlipidemia    LDL is high as of late 2023 / early 2024    Seizure disorder (HCC)    I've had a few seizures from dyhydration and exhaustion, fortunately non in past ~15 years.    Traumatic brain injury (HCC)    Had a few concussions as a child, none in ~20 years.         PAST SURGICAL HISTORY:   History reviewed. No pertinent surgical history.      CURRENT MEDICATIONS:     Current Outpatient Medications   Medication Sig Dispense Refill    BREO ELLIPTA 100-25 MCG/ACT Inhalation Aerosol Powder, Breath Activated Inhale 1 puff into the lungs daily. 1 each 2    DULoxetine (CYMBALTA) 30 MG Oral Cap DR Particles Take 1 capsule (30 mg total) by mouth daily. 30 capsule 0    Multiple Vitamin (MULTIVITAMIN  ADULT OR) Take 1 tablet by mouth daily.      Omega-3 Fatty Acids (FISH OIL OR) Take 1 tablet by mouth daily.      guaiFENesin-codeine (CHERATUSSIN AC) 100-10 MG/5ML Oral Solution Take 5 mL by mouth every 6 (six) hours as needed. 180 mL 0    Omeprazole 40 MG Oral Capsule Delayed Release Take 1 capsule (40 mg total) by mouth daily.      loratadine 10 MG Oral Tab Take 1 tablet (10 mg total) by mouth as needed.           ALLERGIES:     Allergies   Allergen Reactions    Fluzone High-Dose DIARRHEA, NAUSEA AND VOMITING, Coughing and FEVER    Other OTHER (SEE COMMENTS)     Watery eyes and sneezing    Shellfish Allergy DIARRHEA and NAUSEA AND VOMITING     No airway: No EPI Pens.    Sulfa Antibiotics RASH and UNKNOWN    Dust OTHER (SEE COMMENTS)         FAMILY HISTORY:     Family History   Problem Relation Age of Onset    Heart Disease Father         Has A Fib    Stroke Maternal Grandfather          young, smoked & drank    Dementia Paternal Grandfather         Lived into mid 80s, had trouble remembering for last 10 - 15 years    Diabetes Paternal Grandfather         Type II as senior          SOCIAL HISTORY:     Social History     Socioeconomic History    Marital status:    Tobacco Use    Smoking status: Never    Smokeless tobacco: Never    Tobacco comments:     3 cigars in past 15 years   Substance and Sexual Activity    Alcohol use: Not Currently     Alcohol/week: 2.0 standard drinks of alcohol    Drug use: Not Currently     Types: Cannabis     Comment: smoked for 10 years 2-3 x a week. quit 2 months ago     Social Determinants of Health      [facility]    Social Connections    [facility] from [facility]    Housing Stability          REVIEW OF SYSTEMS:   A comprehensive 10 point review of systems was completed.  Pertinent positives and negatives noted in the the HPI.      LABS:   No results found  for: \"EAG\", \"A1C\"  No results found for: \"WBC\", \"RBC\", \"HGB\", \"HCT\", \"MCV\", \"MCH\", \"MCHC\", \"RDW\", \"PLT\", \"MPV\"  No results found for: \"GLU\", \"BUN\", \"BUNCREA\", \"CREATSERUM\", \"ANIONGAP\", \"GFR\", \"GFRNAA\", \"GFRAA\", \"CA\", \"OSMOCALC\", \"ALKPHO\", \"AST\", \"ALT\", \"ALKPHOS\", \"BILT\", \"TP\", \"ALB\", \"GLOBULIN\", \"AGRATIO\", \"NA\", \"K\", \"CL\", \"CO2\"  No results found for: \"PTP\", \"PT\", \"INR\"  No results found for: \"VITD\", \"QVITD\", \"[id number]\"

## 2024-04-18 NOTE — PATIENT INSTRUCTIONS
-Integrative medicine consultation   -Think about epidural injection     RN left voicemail for Patient regarding her MyChart message to check on how she is feeling

## 2024-05-28 ENCOUNTER — OFFICE VISIT (OUTPATIENT)
Dept: FAMILY MEDICINE CLINIC | Facility: CLINIC | Age: 39
End: 2024-05-28

## 2024-05-28 VITALS
DIASTOLIC BLOOD PRESSURE: 74 MMHG | OXYGEN SATURATION: 98 % | TEMPERATURE: 97 F | BODY MASS INDEX: 24.13 KG/M2 | HEIGHT: 74 IN | WEIGHT: 188 LBS | SYSTOLIC BLOOD PRESSURE: 120 MMHG | HEART RATE: 63 BPM | RESPIRATION RATE: 16 BRPM

## 2024-05-28 DIAGNOSIS — L25.5 DERMATITIS DUE TO PLANTS, INCLUDING POISON IVY, SUMAC, AND OAK: Primary | ICD-10-CM

## 2024-05-28 PROCEDURE — 3078F DIAST BP <80 MM HG: CPT | Performed by: NURSE PRACTITIONER

## 2024-05-28 PROCEDURE — 3074F SYST BP LT 130 MM HG: CPT | Performed by: NURSE PRACTITIONER

## 2024-05-28 PROCEDURE — 3008F BODY MASS INDEX DOCD: CPT | Performed by: NURSE PRACTITIONER

## 2024-05-28 PROCEDURE — 99213 OFFICE O/P EST LOW 20 MIN: CPT | Performed by: NURSE PRACTITIONER

## 2024-05-28 RX ORDER — PREDNISONE 10 MG/1
TABLET ORAL
Qty: 28 TABLET | Refills: 0 | Status: SHIPPED | OUTPATIENT
Start: 2024-05-28 | End: 2024-06-07

## 2024-05-28 RX ORDER — CLOBETASOL PROPIONATE 0.5 MG/G
1 CREAM TOPICAL 2 TIMES DAILY
Qty: 30 G | Refills: 0 | Status: SHIPPED | OUTPATIENT
Start: 2024-05-28 | End: 2024-06-04

## 2024-05-28 NOTE — PROGRESS NOTES
CHIEF COMPLAINT:     Chief Complaint   Patient presents with    Rash     Was doing tree trimming 16 days ago, whole body itchiness, rash on L side of abdomen, arms, OTC calamine           HPI:    Reyes Lay is a 39 year old male who presents for evaluation of a rash.  Per patient rash started in the past 2 weeks.  Rash has been same since onset.  Patient denies similar rash in the past. The rash is characterized by redness. The affected locations include left stomach and left arm. Patient has treated rash with calamine.  Associated symptoms include: + pruritis, no tenderness.  Exposure: was trimming trees andd then r exposure to new skin/laundry products, food, or chemicals.  no recent viral illness or infection.    Pertinent negatives include no rash drainage, anorexia, congestion, cough, diarrhea, eye pain, facial edema, fatigue, fever, joint pain, rhinorrhea, shortness of breath, sore throat or vomiting.      Current Outpatient Medications   Medication Sig Dispense Refill    predniSONE 10 MG Oral Tab Take 4 tablets for 3 days, 3 for 3 days, 2 for 3 days and 1 for 1 day 28 tablet 0    clobetasol 0.05 % External Cream Apply 1 Application topically 2 (two) times daily for 7 days. 30 g 0    Omeprazole 40 MG Oral Capsule Delayed Release Take 1 capsule (40 mg total) by mouth daily.      BREO ELLIPTA 100-25 MCG/ACT Inhalation Aerosol Powder, Breath Activated Inhale 1 puff into the lungs daily. 1 each 2    DULoxetine (CYMBALTA) 30 MG Oral Cap DR Particles Take 1 capsule (30 mg total) by mouth daily. (Patient not taking: Reported on 5/28/2024) 30 capsule 0    Multiple Vitamin (MULTIVITAMIN ADULT OR) Take 1 tablet by mouth daily.      Omega-3 Fatty Acids (FISH OIL OR) Take 1 tablet by mouth daily.      guaiFENesin-codeine (CHERATUSSIN AC) 100-10 MG/5ML Oral Solution Take 5 mL by mouth every 6 (six) hours as needed. (Patient not taking: Reported on 5/28/2024) 180 mL 0    loratadine 10 MG Oral Tab Take 1 tablet  (10 mg total) by mouth as needed.        Past Medical History:    Hyperlipidemia    LDL is high as of late  / early     Seizure disorder (HCC)    I've had a few seizures from dyhydration and exhaustion, fortunately non in past ~15 years.    Traumatic brain injury (HCC)    Had a few concussions as a child, none in ~20 years.      History reviewed. No pertinent surgical history.   Family History   Problem Relation Age of Onset    Heart Disease Father         Has A Fib    Stroke Maternal Grandfather          young, smoked & drank    Dementia Paternal Grandfather         Lived into mid 80s, had trouble remembering for last 10 - 15 years    Diabetes Paternal Grandfather         Type II as senior      Social History     Socioeconomic History    Marital status:    Tobacco Use    Smoking status: Never    Smokeless tobacco: Never    Tobacco comments:     3 cigars in past 15 years   Substance and Sexual Activity    Alcohol use: Not Currently     Alcohol/week: 2.0 standard drinks of alcohol    Drug use: Not Currently     Types: Cannabis     Comment: smoked for 10 years 2-3 x a week. quit 2 months ago     Social Determinants of Health      Received from [facility], [facility]    Social Connections    Received from [facility], [facility]    Housing Stability         REVIEW OF SYSTEMS:   GENERAL: feels well otherwise, no fever, no chills.  SKIN: Per HPI. No edema. No ulcerations.  EYES: Denies blurred vision or double vision  HEENT: Denies rhinorrhea, edema of the lips or swelling of throat.  CARDIOVASCULAR: Denies chest pains or palpitations.  LUNGS: Denies shortness of breath with exertion or rest. No cough or wheezing.  LYMPH: Denies enlargement of the lymph nodes.  MUSC/SKEL: Denies joint swelling or joint stiffness.  GI: Denies abdominal pain, N/V/C/D.  NEURO: Denies abnormal sensation, tingling of the skin, or  numbness.      EXAM:   /74   Pulse 63   Temp 97.3 °F (36.3 °C)   Resp 16   Ht 6' 2\" (1.88 m)   Wt 188 lb (85.3 kg)   SpO2 98%   BMI 24.14 kg/m²   GENERAL: well developed, well nourished,in no apparent distress  SKIN: Rash/lesion(s): erythema linear aspects with raised scabs;  located left abdomen and left arm consistent with pant dermatitis; not follicular based;  no tenderness; no drainage;  no s/s secondary infection; + blanching  EYES: PERRLA, EOMI, conjunctiva are clear  HENT: Head atraumatic, normocephalic.   NECK:  Supple. Non tender.  LUNGS: Clear to auscultation bilaterally.  No wheezing, rhonchi, or rales.  No diminished breath sounds. No increased work of breathing.   CARDIO: RRR without murmur  LYMPH: No lymphadenopathy.     ASSESSMENT AND PLAN:   Reyes Lay is a 39 year old male who presents for evaluation of a rash. Findings are consistent with:    ASSESSMENT:  Encounter Diagnosis   Name Primary?    Dermatitis due to plants, including poison ivy, sumac, and oak Yes     1. Dermatitis due to plants, including poison ivy, sumac, and oak  Could trial topical, if no improvement oral steroids.   - predniSONE 10 MG Oral Tab; Take 4 tablets for 3 days, 3 for 3 days, 2 for 3 days and 1 for 1 day  Dispense: 28 tablet; Refill: 0  - clobetasol 0.05 % External Cream; Apply 1 Application topically 2 (two) times daily for 7 days.  Dispense: 30 g; Refill: 0    PLAN: Meds and instructions as listed below.  Comfort measures as described in Patient Instructions.  Skin care discussed with patient.     Meds & Refills for this Visit:  Requested Prescriptions     Signed Prescriptions Disp Refills    predniSONE 10 MG Oral Tab 28 tablet 0     Sig: Take 4 tablets for 3 days, 3 for 3 days, 2 for 3 days and 1 for 1 day    clobetasol 0.05 % External Cream 30 g 0     Sig: Apply 1 Application topically 2 (two) times daily for 7 days.       Risks, benefits, and side effects of medication explained and  discussed.    See pt handout    The patient indicates understanding of these issues and agrees to the plan.

## 2024-07-29 RX ORDER — FLUTICASONE FUROATE AND VILANTEROL TRIFENATATE 100; 25 UG/1; UG/1
1 POWDER RESPIRATORY (INHALATION) DAILY
Qty: 60 EACH | Refills: 3 | Status: SHIPPED | OUTPATIENT
Start: 2024-07-29

## 2024-07-29 NOTE — TELEPHONE ENCOUNTER
Pt last seen by Dr. Prabhakar on 3-20-24.  Pt stated on Breo at that visit and advised to follow up in 1 month.  No follow up appointment scheduled.  My Chart message sent to pt to remind him to schedule a follow up appointment.  Refill for Breo sent.

## 2024-11-06 ENCOUNTER — PATIENT MESSAGE (OUTPATIENT)
Dept: PHYSICAL MEDICINE AND REHAB | Facility: CLINIC | Age: 39
End: 2024-11-06

## 2024-11-12 NOTE — TELEPHONE ENCOUNTER
Per Dr Liz's last office visit note \"Integrative medicine consultation   -Think about epidural injection\"

## 2024-11-19 ENCOUNTER — OFFICE VISIT (OUTPATIENT)
Dept: FAMILY MEDICINE CLINIC | Facility: CLINIC | Age: 39
End: 2024-11-19
Payer: COMMERCIAL

## 2024-11-19 VITALS
HEIGHT: 74 IN | BODY MASS INDEX: 25.41 KG/M2 | DIASTOLIC BLOOD PRESSURE: 68 MMHG | WEIGHT: 198 LBS | RESPIRATION RATE: 16 BRPM | OXYGEN SATURATION: 98 % | HEART RATE: 67 BPM | TEMPERATURE: 98 F | SYSTOLIC BLOOD PRESSURE: 112 MMHG

## 2024-11-19 DIAGNOSIS — J06.9 VIRAL URI WITH COUGH: Primary | ICD-10-CM

## 2024-11-19 PROCEDURE — 3078F DIAST BP <80 MM HG: CPT | Performed by: NURSE PRACTITIONER

## 2024-11-19 PROCEDURE — 3074F SYST BP LT 130 MM HG: CPT | Performed by: NURSE PRACTITIONER

## 2024-11-19 PROCEDURE — 99213 OFFICE O/P EST LOW 20 MIN: CPT | Performed by: NURSE PRACTITIONER

## 2024-11-19 PROCEDURE — 3008F BODY MASS INDEX DOCD: CPT | Performed by: NURSE PRACTITIONER

## 2024-11-19 RX ORDER — BENZONATATE 200 MG/1
200 CAPSULE ORAL 3 TIMES DAILY PRN
Qty: 30 CAPSULE | Refills: 0 | Status: SHIPPED | OUTPATIENT
Start: 2024-11-19

## 2024-11-19 NOTE — PATIENT INSTRUCTIONS
Push fluids  Continue flonase and zyrtec  Benzonatate as prescribed for cough  Albuterol inhaler as prescribed if needed  Follow up with your primary care doctor in 1 week if not improving  Seek urgent follow up for new/ worsening symptoms

## 2024-11-19 NOTE — PROGRESS NOTES
CHIEF COMPLAINT:     Chief Complaint   Patient presents with    Allergies     1 weeks, cough bad at night, congestion  OTC Zyrtec, Flonase, and cough drops       HPI:   Reyes Lay is a 39 year old male who presents for respiratory symptoms x 1 week. Symptoms started with rhinitis which is now improved, but continues to have lingering cough. Cough is mostly dry/ non productive.  Treating symptoms with flonase, zyrtec and cough drops.  Patient reports his child had cold sxs recently but is now improved, no other ill contacts. No fever. Has hx of asthma, not needing to use his inhaler since onset of symptoms.     Current Outpatient Medications   Medication Sig Dispense Refill    benzonatate 200 MG Oral Cap Take 1 capsule (200 mg total) by mouth 3 (three) times daily as needed for cough. 30 capsule 0    BREO ELLIPTA 100-25 MCG/ACT Inhalation Aerosol Powder, Breath Activated INHALE 1 PUFF INTO THE LUNGS DAILY 60 each 3    DULoxetine (CYMBALTA) 30 MG Oral Cap DR Particles Take 1 capsule (30 mg total) by mouth daily. (Patient not taking: Reported on 5/28/2024) 30 capsule 0    Multiple Vitamin (MULTIVITAMIN ADULT OR) Take 1 tablet by mouth daily.      Omega-3 Fatty Acids (FISH OIL OR) Take 1 tablet by mouth daily.      guaiFENesin-codeine (CHERATUSSIN AC) 100-10 MG/5ML Oral Solution Take 5 mL by mouth every 6 (six) hours as needed. (Patient not taking: Reported on 5/28/2024) 180 mL 0    Omeprazole 40 MG Oral Capsule Delayed Release Take 1 capsule (40 mg total) by mouth daily.      loratadine 10 MG Oral Tab Take 1 tablet (10 mg total) by mouth as needed.        Past Medical History:    Hyperlipidemia    LDL is high as of late 2023 / early 2024    Seizure disorder (HCC)    I've had a few seizures from dyhydration and exhaustion, fortunately non in past ~15 years.    Traumatic brain injury (HCC)    Had a few concussions as a child, none in ~20 years.      No past surgical history on file.      Social History      Socioeconomic History    Marital status:    Tobacco Use    Smoking status: Never    Smokeless tobacco: Never    Tobacco comments:     3 cigars in past 15 years   Substance and Sexual Activity    Alcohol use: Not Currently     Alcohol/week: 2.0 standard drinks of alcohol    Drug use: Not Currently     Types: Cannabis     Comment: smoked for 10 years 2-3 x a week. quit 2 months ago     Social Drivers of Health      Received from [facility], [facility]    Social Connections    Received from [facility], [facility]    Housing Stability         REVIEW OF SYSTEMS:   GENERAL: no fever or chills  SKIN: no rashes or abnormal skin lesions  HEENT: See HPI  LUNGS: denies shortness of breath or wheezing, See HPI  CARDIOVASCULAR: denies chest pain or palpitations   GI: denies N/V/C or abdominal pain  NEURO: Denies dizziness or weakness    EXAM:   /68   Pulse 67   Temp 97.5 °F (36.4 °C)   Resp 16   Ht 6' 2\" (1.88 m)   Wt 198 lb (89.8 kg)   SpO2 98%   BMI 25.42 kg/m²   GENERAL: well developed, well nourished,in no apparent distress  SKIN: no rashes,no suspicious lesions  HEAD: atraumatic, normocephalic.  no tenderness on palpation of  sinuses  EYES: conjunctiva clear, EOM intact  EARS: TM's gray, no bulging, no retraction,+ fluid, bony landmarks visible  NOSE: Nostrils patent, no nasal discharge, nasal mucosa non inflamed   THROAT: Oral mucosa pink, moist. Posterior pharynx is non erythematous. no exudates. Tonsils 1/4.    NECK: Supple, non-tender  LUNGS: clear to auscultation bilaterally, no wheezes or rhonchi. Breathing is non labored. No cough during exam.   CARDIO: RRR without murmur  EXTREMITIES: no cyanosis, clubbing or edema  LYMPH:  no lymphadenopathy.        ASSESSMENT AND PLAN:   Reyes Lay is a 39 year old male who presents with upper respiratory symptoms that are consistent with    ASSESSMENT:   Encounter  Diagnosis   Name Primary?    Viral URI with cough Yes         PLAN:   Meds as below.   Comfort care per pt instructions.   To follow up for any new or worsening symptoms or if not improving the next few days.       Meds & Refills for this Visit:  Requested Prescriptions     Signed Prescriptions Disp Refills    benzonatate 200 MG Oral Cap 30 capsule 0     Sig: Take 1 capsule (200 mg total) by mouth 3 (three) times daily as needed for cough.       Risks, benefits, and side effects of medication explained and discussed.    Patient Instructions   Push fluids  Continue flonase and zyrtec  Benzonatate as prescribed for cough  Albuterol inhaler as prescribed if needed  Follow up with your primary care doctor in 1 week if not improving  Seek urgent follow up for new/ worsening symptoms    The patient indicates understanding of these issues and agrees to the plan.  The patient is asked to return if sx's persist or worsen.

## 2024-12-03 ENCOUNTER — TELEPHONE (OUTPATIENT)
Dept: PHYSICAL MEDICINE AND REHAB | Facility: CLINIC | Age: 39
End: 2024-12-03

## 2024-12-03 ENCOUNTER — OFFICE VISIT (OUTPATIENT)
Dept: PHYSICAL MEDICINE AND REHAB | Facility: CLINIC | Age: 39
End: 2024-12-03
Payer: COMMERCIAL

## 2024-12-03 VITALS — WEIGHT: 198 LBS | HEIGHT: 74 IN | BODY MASS INDEX: 25.41 KG/M2

## 2024-12-03 DIAGNOSIS — M54.16 LUMBAR RADICULOPATHY: Primary | ICD-10-CM

## 2024-12-03 DIAGNOSIS — G89.4 CHRONIC PAIN SYNDROME: Primary | ICD-10-CM

## 2024-12-03 DIAGNOSIS — M54.16 RIGHT LUMBAR RADICULOPATHY: ICD-10-CM

## 2024-12-03 PROCEDURE — 3008F BODY MASS INDEX DOCD: CPT | Performed by: PHYSICAL MEDICINE & REHABILITATION

## 2024-12-03 PROCEDURE — 99214 OFFICE O/P EST MOD 30 MIN: CPT | Performed by: PHYSICAL MEDICINE & REHABILITATION

## 2024-12-03 NOTE — TELEPHONE ENCOUNTER
Per Dr. Liz no restrictions for any TFESI with levels of L1-L5   Patient has been scheduled for Right L5 and S1 transforaminal epidural steroid injection on 12/9/24 at the [facility] with Dr. Liz.   Anesthesia type:  Local  Please note: The [facility] will call the business day prior to discuss the exact time/arrival and additional instructions for your appointment.  Patient was advised that if he/she does receive the covid vaccine it needs to be at least 2 weeks before or after the injection.  Medications and allergies reviewed.  Patient informed of [facility]'s  policy:  The patient will require transportation arrangements to and from the procedure, with the  present on site for the entire visit.  Without a , the appointment is subject to cancellation.    [facility] is located in the [facility] 1st floor [address] [place] [address].   may park in the yellow/purple parking lot.  Patient verbalized understanding and agrees with plan.  Scheduled in Epic: Yes  Scheduled in Surgical Case: Yes  Follow up appointment made: NOV: Visit date not found

## 2024-12-03 NOTE — TELEPHONE ENCOUNTER
Initiated authorization for Right L5 and S1 TFESI under fluoroscopy guidance. CPT/HCPCS 56391, 90493 dx:M54.16 to be done at [facility] with Carelon    Status: Approved  Reference/Authorization # [id number]  Valid: 12/03/2024 - 12/16/2024

## 2024-12-03 NOTE — PROGRESS NOTES
[facility]  OFFICE FOLLOW UP EVALUATION      HISTORY OF PRESENT ILLNESS:     Chief Complaint   Patient presents with    Follow - Up     LOV 4/18/24 pt is here with complaints of right sided low back , right hip, knee and foot pain. Daily n/t  throughout right leg and radiates to the foot and it starts after sitting for long periods of time. States the pain came form snow boarding and keeps getting progressively worst. Pt is inquiring about a back injections. Not taking any pain meds or muscle relaxer's. No current physical therapy but does home exercises. Pain  6/10 but increases during the night .        Patient is following up for same chronic pain complaints from last visit.  Pain radiating in the right leg in the posterior aspect of the leg with tingling sensation of the plantar aspect of his foot.  He also has pain in the knee and hip buttock area.  He has discomfort in the right-sided low back.  He cannot start acupuncture treatment as is not covered by insurance.  He rates the pain to be 6 out of 10 but increases at nighttime.  He has a difficult time keeping up with his 18-month-old with lifting and activity can exacerbate his symptoms.  He denies any changes in strength sensation or bowel bladder    PHYSICAL EXAM:   Ht 74\"   Wt 198 lb (89.8 kg)   BMI 25.42 kg/m²     Gait Normal Able to toe walk and heel walk without any difficulty     LUMBAR SPINE:  Inspection: no erythema, swelling, or obvious deformity.  Their iliac crest and shoulder heights are symmetrical.     Palpation: Non tender to palpation of the spinous process.  ROM: FAROM  Strength: 5/5 in bilateral lower extremities  Sensation: Intact to light touch in all dermatomes of the lower extremities  Reflexes: 2/4 at L4 and S1 except 1 out of 4 right L4  Facet Loading: no specific facet pain  Straight leg raise: negative for radicular pain symptoms  Slump test: negative for pain symptoms for radicular  pain symptoms  CORKY and FADIR: Mildly positive  Full active range of motion of the hip.    IMAGING:     X-ray and MRI imaging of the lumbar spine completed at outside institution at [facility] imaging on him for 924 was personally reviewed which is notable for a mild this dehydration at L4-5 and L5-S1 with a mild disc bulge and posterior annular tear at L4-5 without any significant spinal narrowing.  At L5-S1 there is a central bulging protruding disc with left posteromedial annular tear.  There is no significant spinal or foraminal narrowing.    All imaging results were reviewed and discussed with patient.      ASSESSMENT/PLAN:     1. Chronic pain syndrome    2. Right lumbar radiculopathy        Reyes Lay is a 39 year old male following up for chronic pain symptoms with right leg pain.  He does have some symptoms suggestive of possible lumbar radiculopathy though I think this pain is multifactorial.  I have recommended a diagnostic and therapeutic trial of the right L5 and S1 transforaminal epidural injection.  I advised him my office will call him once the injections approved.  Recommend continuing home exercises and follow-up 2 weeks after the epidural procedure.      The patient verbalized understanding with the plan and was in agreement. All questions/concerns were addressed and there were no barriers to learning.  Please note Dragon dictation software was used to dictate this note and may result in inadvertent typos.    Sary Liz DO, FAAPMR & CAQSM  Physical Medicine and Rehabilitation  Sports and Spine Medicine    PAST MEDICAL HISTORY:     Past Medical History:    Hyperlipidemia    LDL is high as of late 2023 / early 2024    Seizure disorder (HCC)    I've had a few seizures from dyhydration and exhaustion, fortunately non in past ~15 years.    Traumatic brain injury (HCC)    Had a few concussions as a child, none in ~20 years.         PAST SURGICAL HISTORY:   History reviewed. No pertinent  surgical history.      CURRENT MEDICATIONS:     Current Outpatient Medications   Medication Sig Dispense Refill    benzonatate 200 MG Oral Cap Take 1 capsule (200 mg total) by mouth 3 (three) times daily as needed for cough. 30 capsule 0    BREO ELLIPTA 100-25 MCG/ACT Inhalation Aerosol Powder, Breath Activated INHALE 1 PUFF INTO THE LUNGS DAILY 60 each 3    DULoxetine (CYMBALTA) 30 MG Oral Cap DR Particles Take 1 capsule (30 mg total) by mouth daily. (Patient not taking: Reported on 2024) 30 capsule 0    Multiple Vitamin (MULTIVITAMIN ADULT OR) Take 1 tablet by mouth daily.      Omega-3 Fatty Acids (FISH OIL OR) Take 1 tablet by mouth daily.      guaiFENesin-codeine (CHERATUSSIN AC) 100-10 MG/5ML Oral Solution Take 5 mL by mouth every 6 (six) hours as needed. (Patient not taking: Reported on 2024) 180 mL 0    Omeprazole 40 MG Oral Capsule Delayed Release Take 1 capsule (40 mg total) by mouth daily.      loratadine 10 MG Oral Tab Take 1 tablet (10 mg total) by mouth as needed.           ALLERGIES:     Allergies   Allergen Reactions    Fluzone High-Dose DIARRHEA, NAUSEA AND VOMITING, Coughing and FEVER    Other OTHER (SEE COMMENTS)     Watery eyes and sneezing    Shellfish Allergy DIARRHEA and NAUSEA AND VOMITING     No airway: No EPI Pens.    Sulfa Antibiotics RASH and UNKNOWN    Dust OTHER (SEE COMMENTS)         FAMILY HISTORY:     Family History   Problem Relation Age of Onset    Heart Disease Father         Has A Fib    Stroke Maternal Grandfather          young, smoked & drank    Dementia Paternal Grandfather         Lived into mid 80s, had trouble remembering for last 10 - 15 years    Diabetes Paternal Grandfather         Type II as senior          SOCIAL HISTORY:     Social History     Socioeconomic History    Marital status:    Tobacco Use    Smoking status: Never    Smokeless tobacco: Never    Tobacco comments:     3 cigars in past 15 years   Substance and Sexual Activity    Alcohol use:  Not Currently     Alcohol/week: 2.0 standard drinks of alcohol    Drug use: Not Currently     Types: Cannabis     Comment: smoked for 10 years 2-3 x a week. quit 2 months ago     Social Drivers of Health      [facility]    Social Connections    [facility] from [facility], [facility]    Housing Stability          REVIEW OF SYSTEMS:   A comprehensive 10 point review of systems was completed.  Pertinent positives and negatives noted in the the HPI.      LABS:   No results found for: \"EAG\", \"A1C\"  No results found for: \"WBC\", \"RBC\", \"HGB\", \"HCT\", \"MCV\", \"MCH\", \"MCHC\", \"RDW\", \"PLT\", \"MPV\"  No results found for: \"GLU\", \"BUN\", \"BUNCREA\", \"CREATSERUM\", \"ANIONGAP\", \"GFR\", \"GFRNAA\", \"GFRAA\", \"CA\", \"OSMOCALC\", \"ALKPHO\", \"AST\", \"ALT\", \"ALKPHOS\", \"BILT\", \"TP\", \"ALB\", \"GLOBULIN\", \"AGRATIO\", \"NA\", \"K\", \"CL\", \"CO2\"  No results found for: \"PTP\", \"PT\", \"INR\"  No results found for: \"VITD\", \"QVITD\", \"KMSF08LY\"

## 2024-12-09 ENCOUNTER — APPOINTMENT (OUTPATIENT)
Dept: SURGERY | Facility: CLINIC | Age: 39
End: 2024-12-09
Payer: COMMERCIAL

## 2025-02-19 ENCOUNTER — APPOINTMENT (OUTPATIENT)
Dept: GENERAL RADIOLOGY | Age: 40
End: 2025-02-19
Attending: PHYSICIAN ASSISTANT
Payer: COMMERCIAL

## 2025-02-19 ENCOUNTER — HOSPITAL ENCOUNTER (OUTPATIENT)
Age: 40
Discharge: HOME | End: 2025-02-19
Payer: COMMERCIAL

## 2025-02-19 VITALS
HEART RATE: 66 BPM | BODY MASS INDEX: 24.25 KG/M2 | SYSTOLIC BLOOD PRESSURE: 124 MMHG | OXYGEN SATURATION: 99 % | RESPIRATION RATE: 16 BRPM | WEIGHT: 195 LBS | TEMPERATURE: 98 F | HEIGHT: 75 IN | DIASTOLIC BLOOD PRESSURE: 83 MMHG

## 2025-02-19 DIAGNOSIS — M79.671 RIGHT FOOT PAIN: Primary | ICD-10-CM

## 2025-02-19 PROCEDURE — 73630 X-RAY EXAM OF FOOT: CPT | Performed by: PHYSICIAN ASSISTANT

## 2025-02-19 PROCEDURE — 99203 OFFICE O/P NEW LOW 30 MIN: CPT | Performed by: PHYSICIAN ASSISTANT

## 2025-02-19 NOTE — ED PROVIDER NOTES
Patient Seen in: [facility]      History     Chief Complaint   Patient presents with    Leg or Foot Injury     My right foot is painful to walk on. It is very painful when I slip a shoe on. I'd like someone familiar with Sports Medicine to review. It's not swollen and looks normal. I'm wondering if an X-ray would help rule out a small fracture. Thank you. - Entered by patient    Foot Pain     Stated Complaint: Leg or Foot Injury - My right foot is painful to walk on. It is very painful wh*    Subjective:   HPI      Patient is a 39-year-old male that presents to immediate care due to right foot pain x 1 week.  Denies acute injury or fall.  Notes pain is worse with walking and going up stairs.  Has been taking pain medication, applying foot splint with minimal relief.    Objective:     Past Medical History:    Hyperlipidemia    LDL is high as of late 2023 / early 2024    Seizure disorder (HCC)    I've had a few seizures from dyhydration and exhaustion, fortunately non in past ~15 years.    Traumatic brain injury (HCC)    Had a few concussions as a child, none in ~20 years.              Past Surgical History:   Procedure Laterality Date    Anesth,surgery of shoulder      Hip surgery                  Social History     Socioeconomic History    Marital status:    Tobacco Use    Smoking status: Never     Passive exposure: Never    Smokeless tobacco: Never    Tobacco comments:     3 cigars in past 15 years   Substance and Sexual Activity    Alcohol use: Not Currently     Alcohol/week: 2.0 standard drinks of alcohol    Drug use: Not Currently     Types: Cannabis     Comment: smoked for 10 years 2-3 x a week. quit 2 months ago     Social Drivers of Health      Received [facility], [facility]    Housing Stability              Review of Systems    Positive for stated complaint: Leg or Foot Injury - My right foot is painful to walk on. It is very painful  wh*  Other systems are as noted in HPI.  Constitutional and vital signs reviewed.      All other systems reviewed and negative except as noted above.    Physical Exam     ED Triage Vitals [02/19/25 1202]   /83   Pulse 66   Resp 16   Temp 97.6 °F (36.4 °C)   Temp src Oral   SpO2 99 %   O2 Device None (Room air)       Current Vitals:   Vital Signs  BP: 124/83  Pulse: 66  Resp: 16  Temp: 97.6 °F (36.4 °C)  Temp src: Oral    Oxygen Therapy  SpO2: 99 %  O2 Device: None (Room air)        Physical Exam  Vital signs reviewed. Nursing note reviewed.  Constitutional: Well-developed. Well-nourished. In no acute distress  HENT: Mucous membranes moist.   EYES: No scleral icterus or conjunctival injection.  NECK: Full ROM. Supple.   CARDIAC: Normal rate. Normal S1/ S2. 2+ distal pulses. No edema  PULM/CHEST: Clear to auscultation bilaterally. No wheezes  Extremities: Full ROM of right foot and ankle.  No overlying edema ecchymosis erythema  NEURO: Awake, alert, following commands, moving extremities, answering questions.   SKIN: Warm and dry. No rash or lesions.  PSYCH: Normal judgment. Normal affect.             MDM      Patient is a 39-year-old male that presents to immediate care due to right foot pain x 1 week.  Patient arrives with stable vitals.  Physical exam showing no obvious deformity or trauma.  X-ray images performed personally reviewed by me showing no acute fracture or dislocation.  Most likely foot sprain versus plantar fasciitis.  Discussed podiatry follow-up if symptoms persist or worsen.  Discussed RICE take Tylenol and ibuprofen as needed for pain.  History given by patient.  Podiatry referral given at time of discharge          Medical Decision Making      Disposition and Plan     Clinical Impression:  1. Right foot pain         Disposition:  Discharge  2/19/2025 12:57 pm    Follow-up:  Poncho Walters DPM  [address] [place] [id number]  [phone]    Schedule an appointment as  soon as possible for a visit             Medications Prescribed:  Discharge Medication List as of 2/19/2025 12:58 PM              Supplementary Documentation:

## 2025-02-25 ENCOUNTER — OFFICE VISIT (OUTPATIENT)
Facility: LOCATION | Age: 40
End: 2025-02-25
Payer: COMMERCIAL

## 2025-02-25 DIAGNOSIS — M79.671 RIGHT FOOT PAIN: ICD-10-CM

## 2025-02-25 DIAGNOSIS — M77.51 BURSITIS OF RIGHT FOOT: ICD-10-CM

## 2025-02-25 DIAGNOSIS — S96.911A MUSCLE STRAIN OF FOOT, RIGHT, INITIAL ENCOUNTER: Primary | ICD-10-CM

## 2025-02-25 PROCEDURE — 99203 OFFICE O/P NEW LOW 30 MIN: CPT | Performed by: PODIATRIST

## 2025-02-25 RX ORDER — METHYLPREDNISOLONE 4 MG/1
TABLET ORAL
Qty: 21 TABLET | Refills: 0 | Status: SHIPPED | OUTPATIENT
Start: 2025-02-25

## 2025-02-25 NOTE — PROGRESS NOTES
[facility]  Progress Note      Reyes Lay is a 39 year old male.   Chief Complaint   Patient presents with    New Patient     Patient has noticed right foot pain for the past 2 weeks. Pain is located on the sole of the foot. Xrays taken on 2/19/25, he does have numbness, rates pain 7/10, denies any swelling or bruising. Does have a history of back, hip and knee pain/treatment.         HPI:     Patient is a pleasant 39-year-old male who is presenting to clinic today with concerns of ongoing right foot pain.  Patient states that over the past 2 weeks, he has noticed progressively worsening right foot pain.  He states that it does cause him pain when he is putting on his shoes, and he has now limping because of the pain.  Rates his pain 7/10.  Denies any injuries.  He does state that he likes to utilize a stationary bike, as well as walking roughly 8000 steps a day.  He is ambulating in FIZZA today.  Patient does have a history of right-sided hip and back pathologies.  He also recently had an epidural due to low back concerns, which he states has not been helpful.  Patient does noticed tingling of his toes, which has been ongoing for a long time.  He states that it does appear to be a positional issue.  He is hoping to discuss treatment options today.      Allergies: Fluzone high-dose, Shellfish allergy, Sulfa antibiotics, and Dust   Current Outpatient Medications   Medication Sig Dispense Refill    methylPREDNISolone 4 MG Oral Tablet Therapy Pack Take per package insert (instructions). 21 tablet 0    Omeprazole 40 MG Oral Capsule Delayed Release Take 1 capsule (40 mg total) by mouth daily.      loratadine 10 MG Oral Tab Take 1 tablet (10 mg total) by mouth as needed.      benzonatate 200 MG Oral Cap Take 1 capsule (200 mg total) by mouth 3 (three) times daily as needed for cough. 30 capsule 0    BREO ELLIPTA 100-25 MCG/ACT Inhalation Aerosol Powder, Breath Activated INHALE 1 PUFF INTO THE  LUNGS DAILY 60 each 3    DULoxetine (CYMBALTA) 30 MG Oral Cap DR Particles Take 1 capsule (30 mg total) by mouth daily. (Patient not taking: Reported on 2024) 30 capsule 0    Multiple Vitamin (MULTIVITAMIN ADULT OR) Take 1 tablet by mouth daily.      Omega-3 Fatty Acids (FISH OIL OR) Take 1 tablet by mouth daily.      guaiFENesin-codeine (CHERATUSSIN AC) 100-10 MG/5ML Oral Solution Take 5 mL by mouth every 6 (six) hours as needed. (Patient not taking: Reported on 2024) 180 mL 0      Past Medical History:    Hyperlipidemia    LDL is high as of late  / early     Seizure disorder (HCC)    I've had a few seizures from dyhydration and exhaustion, fortunately non in past ~15 years.    Traumatic brain injury (HCC)    Had a few concussions as a child, none in ~20 years.      Past Surgical History:   Procedure Laterality Date    Anesth,surgery of shoulder      Hip surgery        Family History   Problem Relation Age of Onset    Heart Disease Father         Has A Fib    Stroke Maternal Grandfather          young, smoked & drank    Dementia Paternal Grandfather         Lived into mid 80s, had trouble remembering for last 10 - 15 years    Diabetes Paternal Grandfather         Type II as senior      Social History     Socioeconomic History    Marital status:    Tobacco Use    Smoking status: Never     Passive exposure: Never    Smokeless tobacco: Never    Tobacco comments:     3 cigars in past 15 years   Substance and Sexual Activity    Alcohol use: Not Currently     Alcohol/week: 2.0 standard drinks of alcohol    Drug use: Not Currently     Types: Cannabis     Comment: smoked for 10 years 2-3 x a week. quit 2 months ago           REVIEW OF SYSTEMS:     10 point ROS completed and was negative unless stated in HPI.      EXAM:     Right lower extremity focused exam:  GENERAL: well developed, well nourished, in no apparent distress  EXTREMITIES:  1. Integument: Skin appears moist, warm, and supple with  positive hair growth. There are no color changes. No open lesions. No macerations. No Hyperkeratotic lesions.   2. Vascular: Dorsalis pedis 2/4 and posterior tibial pulse 2/4, capillary refill normal.  No signs of edema to right foot  3. Neurological: Gross sensation intact via light touch bilaterally.  Paresthesias to digits of right foot  4. Musculoskeletal: All muscle groups are graded 5/5 in the foot and ankle with no pain elicited.  Pain with palpation within the third and fourth intermetatarsal space, particularly at the proximal aspects.  No pain with palpation to the metatarsals of the right foot.  No acute deformities noted.     XR FOOT, COMPLETE (MIN 3 VIEWS), RIGHT (CPT=73630)    Result Date: 2/19/2025  CONCLUSION:  Negative.   LOCATION:  [place] [id number]   Dictated by (CST): Jesus Britt MD on 2/19/2025 at 12:53 PM     Finalized by (CST): Jesus Britt MD on 2/19/2025 at 12:54 PM          ASSESSMENT AND PLAN:   Diagnoses and all orders for this visit:    Muscle strain of foot, right, initial encounter  -     methylPREDNISolone 4 MG Oral Tablet Therapy Pack; Take per package insert (instructions).  -     DME - EXTERNAL     Bursitis of right foot  -     methylPREDNISolone 4 MG Oral Tablet Therapy Pack; Take per package insert (instructions).  -     DME - EXTERNAL     Right foot pain  -     methylPREDNISolone 4 MG Oral Tablet Therapy Pack; Take per package insert (instructions).  -     DME - EXTERNAL         Plan:   -Patient was seen and evaluated today in clinic.  Chart history reviewed.    Reviewed patient's most recent radiographs, showing no acute osseous abnormalities.    On clinical exam, patient's pain is elicited within the third and fourth intermetatarsal spaces.  Discussed with patient the anatomy in this location, which consists of intrinsic muscles, nerves, and bursas.    I believe patient is experiencing an intrinsic muscle strain or bursitis.  I do not believe that this is a neurological  issue as patient has had ongoing tingling in his toes for a long time, which she states has not worsened recently.    Discussed treatment recommendations.  Recommending patient continue to rest, ice, and elevate    Patient was properly fitted for and provided with a short cam boot today.  Recommend patient begin weightbearing as tolerated in short cam boot to right lower extremity.  If pain does continue while ambulating, patient should consider nonweightbearing until follow-up    Rx: Medrol Dosepak    Will discuss intrinsic muscle exercises if patient progresses in a positive direction    If symptoms persist, explained to patient that advanced imaging may be warranted    -All of the patient's questions and concerns were addressed.  They indicated their understanding of these issues and agrees to the plan.    Time spent reviewing pertinent information from patient's chart, reviewing any pertinent imaging, obtaining history and physical exam, discussing and mutually agreeing on a treatment plan, and documenting encounter: 40 minutes    RTC 3 weeks    Luis Matute DPM, AACFAS        2/25/2025    [address] [id number]   [email or web address]            [employer] speech recognition software was used to prepare this note.  Errors in word recognition may occur.  Please contact me with any questions/concerns with this note.

## 2025-05-06 ENCOUNTER — TELEPHONE (OUTPATIENT)
Facility: LOCATION | Age: 40
End: 2025-05-06

## 2025-05-13 ENCOUNTER — OFFICE VISIT (OUTPATIENT)
Facility: LOCATION | Age: 40
End: 2025-05-13
Payer: COMMERCIAL

## 2025-05-13 DIAGNOSIS — M77.51 BURSITIS OF RIGHT FOOT: ICD-10-CM

## 2025-05-13 DIAGNOSIS — B07.0 PLANTAR WART, RIGHT FOOT: ICD-10-CM

## 2025-05-13 DIAGNOSIS — S96.911A MUSCLE STRAIN OF FOOT, RIGHT, INITIAL ENCOUNTER: ICD-10-CM

## 2025-05-13 DIAGNOSIS — S92.811K CLOSED FRACTURE OF SESAMOID BONE OF RIGHT FOOT WITH NONUNION, SUBSEQUENT ENCOUNTER: Primary | ICD-10-CM

## 2025-05-13 DIAGNOSIS — M25.871 SESAMOIDITIS OF RIGHT FOOT: ICD-10-CM

## 2025-05-13 DIAGNOSIS — M79.671 RIGHT FOOT PAIN: ICD-10-CM

## 2025-05-13 PROCEDURE — 99214 OFFICE O/P EST MOD 30 MIN: CPT | Performed by: PODIATRIST

## 2025-05-13 NOTE — PROGRESS NOTES
[facility]  Progress Note      Reyes Lay is a 40 year old male.   Chief Complaint   Patient presents with    Test Results     MRI Review  Feels the right foot is a bit worse         HPI:     The following individual(s) verbally consented to be recorded using ambient AI listening technology and understand that they can each withdraw their consent to this listening technology at any point by asking the clinician to turn off or pause the recording:      History of Present Illness  Reyes Lay is a 40 year old male who presents for follow-up rounds of right foot pain.    He experiences significant foot pain primarily located on his dominant foot, exacerbated by descending stairs and particularly severe at night, reaching a pain level of seven, which affects his sleep. The pain is described as throbbing and tingling.    The pain was particularly intense about a week ago, waking him up at four in the morning. He associates the onset of pain with activities such as squatting, similar to a baseball catcher, while doing repair work around the house.    He wore a cam boot for about four weeks after his last visit, which provided minimal relief. New shoes and high arch sandals have helped to some extent, although the sandals tend to slip off.    An MRI was performed, revealing inflammation in the sesamoids. He mentions a white spot on his foot, suspected to be a wart or wear spot, frequently landing on that part of the foot.    He leads an active lifestyle, which might contribute to the overuse injury.  Cam boot was used for about four weeks with limited success, adhering to the treatment about 90-95% of the time.    He has a history of shoulder and hip surgeries with unsatisfactory outcomes, making him cautious about surgical interventions. He wants to manage the pain to a livable level, ideally reducing it by 80%.      Allergies: Fluzone high-dose, Shellfish allergy, Sulfa antibiotics, and Dust    Current Medications[1]   Past Medical History[2]   Past Surgical History[3]   Family History[4]   Social Hx on file[5]        REVIEW OF SYSTEMS:     10 point ROS completed and was negative unless stated in HPI.      EXAM:     Right lower extremity focused exam:  GENERAL: well developed, well nourished, in no apparent distress  EXTREMITIES:  1. Integument: Skin appears moist, warm, and supple with positive hair growth. There are no color changes. No open lesions. No macerations.  Small HPK with pinpoint bleeding to plantar first metatarsal head consistent with verrucoid tissue.  2. Vascular: Dorsalis pedis 2/4 and posterior tibial pulse 2/4, capillary refill normal.  No signs of edema to right foot  3. Neurological: Gross sensation intact via light touch bilaterally.  Paresthesias to digits of right foot  4. Musculoskeletal: All muscle groups are graded 5/5 in the foot and ankle with no pain elicited.  Pain elicited with palpation to fibular sesamoid.  No pain with forced plantarflexion or dorsiflexion of right first MPJ.  Pain with palpation within the third and fourth intermetatarsal space, particularly at the proximal aspects.  No pain with palpation to the metatarsals of the right foot.  No acute deformities noted.      ASSESSMENT AND PLAN:   Diagnoses and all orders for this visit:    Closed fracture of sesamoid bone of right foot with nonunion, subsequent encounter    Sesamoiditis of right foot    Plantar wart, right foot    Muscle strain of foot, right, initial encounter    Bursitis of right foot    Right foot pain        Plan:   -Patient was seen and evaluated today in clinic.  Chart history reviewed.    Assessment & Plan  Sesamoiditis and possible sesamoid fracture  Chronic right foot pain in the sesamoid region with tenderness and inflammation. MRI suggests possible sesamoid fracture after independent review today.  Patient did bring his MRI on a disk, which was uploaded to our PACS system. Differential  includes sesamoiditis or inflammation due to fracture not healing properly. Discussed potential use of a bone stimulator. Surgery considered only if pain becomes debilitating and if all conservative measures fail. Injection not recommended.  - Recommend offloading with supportive inserts and felt pads to reduce pressure on the sesamoid area.  Provided gel pads today.  - I will  for a bone stimulator to facilitate healing, pending insurance approval.  - Discuss potential for surgery to remove the sesamoid if pain becomes debilitating, but not currently recommended.    Plantar wart  Plantar wart causing discomfort. Debridement performed and salicylic acid treatment initiated.  - Selective debridement performed to HPK plantar right foot, revealing a pinpoint bleeding lesion with break in skin lines consistent with verrucoid tissue covered with bacitracin and Band-Aid today.  - Apply Metaplast, a salicylic acid treatment, to the wart to facilitate resolution.  - Monitor for improvement and consider further debridement if necessary.      -All of the patient's questions and concerns were addressed.  They indicated their understanding of these issues and agrees to the plan.    Time spent reviewing pertinent information from patient's chart, reviewing any pertinent imaging, obtaining history and physical exam, discussing and mutually agreeing on a treatment plan, and documenting encounter: 40 minutes    RTC 6 weeks    Luis Matute DPM, AACFAS        5/13/2025    [facility]  [address] [place] [id number]   [email or web address]            Dragon speech recognition software was used to prepare this note.  Errors in word recognition may occur.  Please contact me with any questions/concerns with this note.          [1]   Current Outpatient Medications   Medication Sig Dispense Refill    methylPREDNISolone 4 MG Oral Tablet Therapy Pack Take per package insert  (instructions). (Patient not taking: Reported on 2025) 21 tablet 0    benzonatate 200 MG Oral Cap Take 1 capsule (200 mg total) by mouth 3 (three) times daily as needed for cough. 30 capsule 0    BREO ELLIPTA 100-25 MCG/ACT Inhalation Aerosol Powder, Breath Activated INHALE 1 PUFF INTO THE LUNGS DAILY 60 each 3    DULoxetine (CYMBALTA) 30 MG Oral Cap DR Particles Take 1 capsule (30 mg total) by mouth daily. (Patient not taking: Reported on 2024) 30 capsule 0    Multiple Vitamin (MULTIVITAMIN ADULT OR) Take 1 tablet by mouth daily.      Omega-3 Fatty Acids (FISH OIL OR) Take 1 tablet by mouth daily.      guaiFENesin-codeine (CHERATUSSIN AC) 100-10 MG/5ML Oral Solution Take 5 mL by mouth every 6 (six) hours as needed. (Patient not taking: Reported on 2024) 180 mL 0    Omeprazole 40 MG Oral Capsule Delayed Release Take 1 capsule (40 mg total) by mouth daily.      loratadine 10 MG Oral Tab Take 1 tablet (10 mg total) by mouth as needed.     [2]   Past Medical History:   Allergic rhinitis    Dust    Arthritis    Right hand, right hip, left shoulder    Hyperlipidemia    LDL is high as of late  / early     Seizure disorder (HCC)    I've had a few seizures from dyhydration and exhaustion, fortunately non in past ~15 years.    Traumatic brain injury (HCC)    Had a few concussions as a child, none in ~20 years.   [3]   Past Surgical History:  Procedure Laterality Date    Anesth,surgery of shoulder      Hip surgery      Other surgical history      2 left shoulder & 1 right hip othroscopics   [4]   Family History  Problem Relation Age of Onset    Heart Disease Father         Has A Fib    Heart Disorder Father         Still alive and doing alright at 74    Obesity Father         Poor diet    Stroke Maternal Grandfather          young, smoked & drank    Dementia Paternal Grandfather         Lived into mid 80s, had trouble remembering for last 10 - 15 years    Diabetes Paternal Grandfather          Type II as senior    Cancer Mother          in  from breast cancer   [5]   Social History  Socioeconomic History    Marital status:    Tobacco Use    Smoking status: Never     Passive exposure: Never    Smokeless tobacco: Never    Tobacco comments:     3 cigars in past 15 years   Substance and Sexual Activity    Alcohol use: Not Currently     Alcohol/week: 2.0 standard drinks of alcohol    Drug use: Not Currently     Types: Cannabis     Comment: smoked for 10 years 2-3 x a week. quit 2 months ago

## 2025-05-14 ENCOUNTER — TELEPHONE (OUTPATIENT)
Facility: LOCATION | Age: 40
End: 2025-05-14

## 2025-05-14 NOTE — TELEPHONE ENCOUNTER
Per Dr Matute he ordered a bone stimulator for patient. Faxed order, demographics, insurance card, MRI right foot report, xray right foot 2/19/25 and 5/13/25 office visit to Juan at [phone]